# Patient Record
Sex: MALE | Race: OTHER | Employment: FULL TIME | URBAN - METROPOLITAN AREA
[De-identification: names, ages, dates, MRNs, and addresses within clinical notes are randomized per-mention and may not be internally consistent; named-entity substitution may affect disease eponyms.]

---

## 2020-05-12 ENCOUNTER — APPOINTMENT (OUTPATIENT)
Dept: CT IMAGING | Age: 35
End: 2020-05-12
Attending: NURSE PRACTITIONER
Payer: COMMERCIAL

## 2020-05-12 ENCOUNTER — HOSPITAL ENCOUNTER (EMERGENCY)
Age: 35
Discharge: HOME | End: 2020-05-12
Attending: EMERGENCY MEDICINE
Payer: COMMERCIAL

## 2020-05-12 VITALS
SYSTOLIC BLOOD PRESSURE: 151 MMHG | BODY MASS INDEX: 42.57 KG/M2 | DIASTOLIC BLOOD PRESSURE: 108 MMHG | HEIGHT: 65 IN | RESPIRATION RATE: 21 BRPM | OXYGEN SATURATION: 97 % | HEART RATE: 98 BPM | WEIGHT: 255.51 LBS | TEMPERATURE: 98.6 F

## 2020-05-12 DIAGNOSIS — M54.50 ACUTE LEFT-SIDED LOW BACK PAIN WITHOUT SCIATICA: ICD-10-CM

## 2020-05-12 DIAGNOSIS — T14.8XXA MUSCLE CONTUSION: ICD-10-CM

## 2020-05-12 DIAGNOSIS — R81 GLYCOSURIA: Primary | ICD-10-CM

## 2020-05-12 LAB
APPEARANCE UR: CLEAR
BACTERIA URNS QL MICRO: NEGATIVE /HPF
BILIRUB UR QL: NEGATIVE
COLOR UR: ABNORMAL
EPITH CASTS URNS QL MICRO: ABNORMAL /LPF
GLUCOSE UR STRIP.AUTO-MCNC: >1000 MG/DL
HGB UR QL STRIP: NEGATIVE
KETONES UR QL STRIP.AUTO: NEGATIVE MG/DL
LEUKOCYTE ESTERASE UR QL STRIP.AUTO: ABNORMAL
NITRITE UR QL STRIP.AUTO: NEGATIVE
PH UR STRIP: 6 [PH] (ref 5–8)
PROT UR STRIP-MCNC: NEGATIVE MG/DL
RBC #/AREA URNS HPF: ABNORMAL /HPF (ref 0–5)
SP GR UR REFRACTOMETRY: 1.01 (ref 1–1.03)
UROBILINOGEN UR QL STRIP.AUTO: 0.2 EU/DL (ref 0.2–1)
WBC URNS QL MICRO: ABNORMAL /HPF (ref 0–4)

## 2020-05-12 PROCEDURE — 74011250637 HC RX REV CODE- 250/637: Performed by: NURSE PRACTITIONER

## 2020-05-12 PROCEDURE — 72131 CT LUMBAR SPINE W/O DYE: CPT

## 2020-05-12 PROCEDURE — 74011000250 HC RX REV CODE- 250: Performed by: NURSE PRACTITIONER

## 2020-05-12 PROCEDURE — 81001 URINALYSIS AUTO W/SCOPE: CPT

## 2020-05-12 PROCEDURE — 99284 EMERGENCY DEPT VISIT MOD MDM: CPT

## 2020-05-12 PROCEDURE — 70450 CT HEAD/BRAIN W/O DYE: CPT

## 2020-05-12 RX ORDER — NAPROXEN 250 MG/1
500 TABLET ORAL
Status: COMPLETED | OUTPATIENT
Start: 2020-05-12 | End: 2020-05-12

## 2020-05-12 RX ORDER — BISMUTH SUBSALICYLATE 262 MG
1 TABLET,CHEWABLE ORAL DAILY
COMMUNITY
End: 2021-12-06

## 2020-05-12 RX ORDER — ACETAMINOPHEN 500 MG
1000 TABLET ORAL
Status: COMPLETED | OUTPATIENT
Start: 2020-05-12 | End: 2020-05-12

## 2020-05-12 RX ORDER — LIDOCAINE 4 G/100G
1 PATCH TOPICAL
Status: DISCONTINUED | OUTPATIENT
Start: 2020-05-12 | End: 2020-05-12 | Stop reason: HOSPADM

## 2020-05-12 RX ORDER — METFORMIN HYDROCHLORIDE 500 MG/1
500 TABLET ORAL 2 TIMES DAILY WITH MEALS
Qty: 30 TAB | Refills: 0 | Status: SHIPPED | OUTPATIENT
Start: 2020-05-12 | End: 2021-12-06

## 2020-05-12 RX ORDER — METFORMIN HYDROCHLORIDE 500 MG/1
500 TABLET ORAL
Qty: 7 TAB | Refills: 0 | Status: SHIPPED | OUTPATIENT
Start: 2020-05-12 | End: 2020-05-19

## 2020-05-12 RX ADMIN — NAPROXEN 500 MG: 250 TABLET ORAL at 15:18

## 2020-05-12 RX ADMIN — ACETAMINOPHEN 1000 MG: 500 TABLET, FILM COATED ORAL at 15:18

## 2020-05-12 NOTE — DISCHARGE INSTRUCTIONS
You need to follow up within 2 weeks to have your blood sugar levels checked, since we restarted your metformin. Please take 500 mg once daily for 7 days, then 500 mg twice daily thereafter (breakfast + dinner)  You may experience diarrhea from this medication. Take tylenol as needed for back / shoulder pain. You do not have a fracture based on your CT scan. Your back pain may last for several more days. Resume normal activity as tolerated. Patient Education        Back Pain: Care Instructions  Your Care Instructions    Back pain has many possible causes. It is often related to problems with muscles and ligaments of the back. It may also be related to problems with the nerves, discs, or bones of the back. Moving, lifting, standing, sitting, or sleeping in an awkward way can strain the back. Sometimes you don't notice the injury until later. Arthritis is another common cause of back pain. Although it may hurt a lot, back pain usually improves on its own within several weeks. Most people recover in 12 weeks or less. Using good home treatment and being careful not to stress your back can help you feel better sooner. Follow-up care is a key part of your treatment and safety. Be sure to make and go to all appointments, and call your doctor if you are having problems. It's also a good idea to know your test results and keep a list of the medicines you take. How can you care for yourself at home? · Sit or lie in positions that are most comfortable and reduce your pain. Try one of these positions when you lie down:  ? Lie on your back with your knees bent and supported by large pillows. ? Lie on the floor with your legs on the seat of a sofa or chair. ? Lie on your side with your knees and hips bent and a pillow between your legs. ? Lie on your stomach if it does not make pain worse. · Do not sit up in bed, and avoid soft couches and twisted positions.  Bed rest can help relieve pain at first, but it delays healing. Avoid bed rest after the first day of back pain. · Change positions every 30 minutes. If you must sit for long periods of time, take breaks from sitting. Get up and walk around, or lie in a comfortable position. · Try using a heating pad on a low or medium setting for 15 to 20 minutes every 2 or 3 hours. Try a warm shower in place of one session with the heating pad. · You can also try an ice pack for 10 to 15 minutes every 2 to 3 hours. Put a thin cloth between the ice pack and your skin. · Take pain medicines exactly as directed. ? If the doctor gave you a prescription medicine for pain, take it as prescribed. ? If you are not taking a prescription pain medicine, ask your doctor if you can take an over-the-counter medicine. · Take short walks several times a day. You can start with 5 to 10 minutes, 3 or 4 times a day, and work up to longer walks. Walk on level surfaces and avoid hills and stairs until your back is better. · Return to work and other activities as soon as you can. Continued rest without activity is usually not good for your back. · To prevent future back pain, do exercises to stretch and strengthen your back and stomach. Learn how to use good posture, safe lifting techniques, and proper body mechanics. When should you call for help? Call your doctor now or seek immediate medical care if:    · You have new or worsening numbness in your legs.     · You have new or worsening weakness in your legs. (This could make it hard to stand up.)     · You lose control of your bladder or bowels.    Watch closely for changes in your health, and be sure to contact your doctor if:    · You have a fever, lose weight, or don't feel well.     · You do not get better as expected. Where can you learn more? Go to http://sailaja-antonio.info/  Enter I594 in the search box to learn more about \"Back Pain: Care Instructions. \"  Current as of: June 26, 2019Content Version: 12.4  © 1379-5179 [employer]. Care instructions adapted under license by [employer] (which disclaims liability or warranty for this information). If you have questions about a medical condition or this instruction, always ask your healthcare professional. [place] [address] any warranty or liability for your use of this information.
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
## 2021-12-06 ENCOUNTER — APPOINTMENT (OUTPATIENT)
Dept: VASCULAR SURGERY | Age: 36
DRG: 177 | End: 2021-12-06
Attending: INTERNAL MEDICINE

## 2021-12-06 ENCOUNTER — APPOINTMENT (OUTPATIENT)
Dept: GENERAL RADIOLOGY | Age: 36
DRG: 177 | End: 2021-12-06
Attending: EMERGENCY MEDICINE

## 2021-12-06 ENCOUNTER — APPOINTMENT (OUTPATIENT)
Dept: CT IMAGING | Age: 36
DRG: 177 | End: 2021-12-06
Attending: INTERNAL MEDICINE

## 2021-12-06 ENCOUNTER — HOSPITAL ENCOUNTER (INPATIENT)
Age: 36
LOS: 7 days | Discharge: HOME | DRG: 177 | End: 2021-12-13
Attending: EMERGENCY MEDICINE | Admitting: INTERNAL MEDICINE

## 2021-12-06 ENCOUNTER — APPOINTMENT (OUTPATIENT)
Dept: ULTRASOUND IMAGING | Age: 36
DRG: 177 | End: 2021-12-06
Attending: INTERNAL MEDICINE

## 2021-12-06 DIAGNOSIS — J12.82 PNEUMONIA DUE TO COVID-19 VIRUS: Primary | ICD-10-CM

## 2021-12-06 DIAGNOSIS — U07.1 PNEUMONIA DUE TO COVID-19 VIRUS: Primary | ICD-10-CM

## 2021-12-06 DIAGNOSIS — J96.01 ACUTE RESPIRATORY FAILURE WITH HYPOXIA (HCC): ICD-10-CM

## 2021-12-06 LAB
ALBUMIN SERPL-MCNC: 2.6 G/DL (ref 3.5–5)
ALBUMIN/GLOB SERPL: 0.5 {RATIO} (ref 1.1–2.2)
ALP SERPL-CCNC: 122 U/L (ref 45–117)
ALT SERPL-CCNC: 48 U/L (ref 12–78)
ANION GAP SERPL CALC-SCNC: 9 MMOL/L (ref 5–15)
AST SERPL-CCNC: 50 U/L (ref 15–37)
BASOPHILS # BLD: 0 K/UL (ref 0–0.1)
BASOPHILS NFR BLD: 0 % (ref 0–1)
BILIRUB SERPL-MCNC: 0.5 MG/DL (ref 0.2–1)
BUN SERPL-MCNC: 12 MG/DL (ref 6–20)
BUN/CREAT SERPL: 16 (ref 12–20)
CALCIUM SERPL-MCNC: 8.5 MG/DL (ref 8.5–10.1)
CHLORIDE SERPL-SCNC: 100 MMOL/L (ref 97–108)
CO2 SERPL-SCNC: 24 MMOL/L (ref 21–32)
COVID-19 RAPID TEST, COVR: DETECTED
CREAT SERPL-MCNC: 0.73 MG/DL (ref 0.7–1.3)
CRP SERPL-MCNC: 8.4 MG/DL (ref 0–0.6)
D DIMER PPP FEU-MCNC: 8.35 MG/L FEU (ref 0–0.65)
DIFFERENTIAL METHOD BLD: ABNORMAL
EOSINOPHIL # BLD: 0 K/UL (ref 0–0.4)
EOSINOPHIL NFR BLD: 0 % (ref 0–7)
ERYTHROCYTE [DISTWIDTH] IN BLOOD BY AUTOMATED COUNT: 11.4 % (ref 11.5–14.5)
EST. AVERAGE GLUCOSE BLD GHB EST-MCNC: 269 MG/DL
GLOBULIN SER CALC-MCNC: 4.8 G/DL (ref 2–4)
GLUCOSE BLD STRIP.AUTO-MCNC: 204 MG/DL (ref 65–117)
GLUCOSE BLD STRIP.AUTO-MCNC: 208 MG/DL (ref 65–117)
GLUCOSE BLD STRIP.AUTO-MCNC: 257 MG/DL (ref 65–117)
GLUCOSE SERPL-MCNC: 247 MG/DL (ref 65–100)
HAV IGM SER QL: NONREACTIVE
HBA1C MFR BLD: 11 % (ref 4–5.6)
HBV CORE IGM SER QL: NONREACTIVE
HBV SURFACE AG SER QL: <0.1 INDEX
HBV SURFACE AG SER QL: NEGATIVE
HCT VFR BLD AUTO: 44.4 % (ref 36.6–50.3)
HCV AB SERPL QL IA: NONREACTIVE
HGB BLD-MCNC: 15.5 G/DL (ref 12.1–17)
IMM GRANULOCYTES # BLD AUTO: 0.2 K/UL (ref 0–0.04)
IMM GRANULOCYTES NFR BLD AUTO: 2 % (ref 0–0.5)
LACTATE SERPL-SCNC: 1.2 MMOL/L (ref 0.4–2)
LYMPHOCYTES # BLD: 1.2 K/UL (ref 0.8–3.5)
LYMPHOCYTES NFR BLD: 12 % (ref 12–49)
MCH RBC QN AUTO: 28.8 PG (ref 26–34)
MCHC RBC AUTO-ENTMCNC: 34.9 G/DL (ref 30–36.5)
MCV RBC AUTO: 82.5 FL (ref 80–99)
MONOCYTES # BLD: 0.6 K/UL (ref 0–1)
MONOCYTES NFR BLD: 7 % (ref 5–13)
NEUTS SEG # BLD: 7.4 K/UL (ref 1.8–8)
NEUTS SEG NFR BLD: 79 % (ref 32–75)
NRBC # BLD: 0 K/UL (ref 0–0.01)
NRBC BLD-RTO: 0 PER 100 WBC
PLATELET # BLD AUTO: 412 K/UL (ref 150–400)
PMV BLD AUTO: 8.9 FL (ref 8.9–12.9)
POTASSIUM SERPL-SCNC: 4.2 MMOL/L (ref 3.5–5.1)
PROCALCITONIN SERPL-MCNC: 0.08 NG/ML
PROCALCITONIN SERPL-MCNC: 0.11 NG/ML
PROT SERPL-MCNC: 7.4 G/DL (ref 6.4–8.2)
RBC # BLD AUTO: 5.38 M/UL (ref 4.1–5.7)
SERVICE CMNT-IMP: ABNORMAL
SODIUM SERPL-SCNC: 133 MMOL/L (ref 136–145)
SOURCE, COVRS: ABNORMAL
SP1: NORMAL
SP2: NORMAL
SP3: NORMAL
TROPONIN-HIGH SENSITIVITY: 4 NG/L (ref 0–76)
TROPONIN-HIGH SENSITIVITY: 6 NG/L (ref 0–76)
WBC # BLD AUTO: 9.4 K/UL (ref 4.1–11.1)

## 2021-12-06 PROCEDURE — 80074 ACUTE HEPATITIS PANEL: CPT

## 2021-12-06 PROCEDURE — 86140 C-REACTIVE PROTEIN: CPT

## 2021-12-06 PROCEDURE — 76700 US EXAM ABDOM COMPLETE: CPT

## 2021-12-06 PROCEDURE — 74011250637 HC RX REV CODE- 250/637: Performed by: INTERNAL MEDICINE

## 2021-12-06 PROCEDURE — 85025 COMPLETE CBC W/AUTO DIFF WBC: CPT

## 2021-12-06 PROCEDURE — 71275 CT ANGIOGRAPHY CHEST: CPT

## 2021-12-06 PROCEDURE — 74011250636 HC RX REV CODE- 250/636: Performed by: INTERNAL MEDICINE

## 2021-12-06 PROCEDURE — 85379 FIBRIN DEGRADATION QUANT: CPT

## 2021-12-06 PROCEDURE — XW0DXM6 INTRODUCTION OF BARICITINIB INTO MOUTH AND PHARYNX, EXTERNAL APPROACH, NEW TECHNOLOGY GROUP 6: ICD-10-PCS | Performed by: INTERNAL MEDICINE

## 2021-12-06 PROCEDURE — 77010033678 HC OXYGEN DAILY

## 2021-12-06 PROCEDURE — 74011636637 HC RX REV CODE- 636/637: Performed by: INTERNAL MEDICINE

## 2021-12-06 PROCEDURE — 99285 EMERGENCY DEPT VISIT HI MDM: CPT

## 2021-12-06 PROCEDURE — 71045 X-RAY EXAM CHEST 1 VIEW: CPT

## 2021-12-06 PROCEDURE — 94660 CPAP INITIATION&MGMT: CPT

## 2021-12-06 PROCEDURE — 5A09457 ASSISTANCE WITH RESPIRATORY VENTILATION, 24-96 CONSECUTIVE HOURS, CONTINUOUS POSITIVE AIRWAY PRESSURE: ICD-10-PCS | Performed by: INTERNAL MEDICINE

## 2021-12-06 PROCEDURE — 83036 HEMOGLOBIN GLYCOSYLATED A1C: CPT

## 2021-12-06 PROCEDURE — 84484 ASSAY OF TROPONIN QUANT: CPT

## 2021-12-06 PROCEDURE — 77010033711 HC HIGH FLOW OXYGEN

## 2021-12-06 PROCEDURE — 82962 GLUCOSE BLOOD TEST: CPT

## 2021-12-06 PROCEDURE — 77030012794 HC KT NSL CANN/HGH TRAN -A

## 2021-12-06 PROCEDURE — 84145 PROCALCITONIN (PCT): CPT

## 2021-12-06 PROCEDURE — 93970 EXTREMITY STUDY: CPT

## 2021-12-06 PROCEDURE — 83605 ASSAY OF LACTIC ACID: CPT

## 2021-12-06 PROCEDURE — 36415 COLL VENOUS BLD VENIPUNCTURE: CPT

## 2021-12-06 PROCEDURE — 65660000000 HC RM CCU STEPDOWN

## 2021-12-06 PROCEDURE — 80053 COMPREHEN METABOLIC PANEL: CPT

## 2021-12-06 PROCEDURE — 87040 BLOOD CULTURE FOR BACTERIA: CPT

## 2021-12-06 PROCEDURE — 74011000636 HC RX REV CODE- 636: Performed by: INTERNAL MEDICINE

## 2021-12-06 PROCEDURE — 74011250636 HC RX REV CODE- 250/636: Performed by: EMERGENCY MEDICINE

## 2021-12-06 PROCEDURE — 87635 SARS-COV-2 COVID-19 AMP PRB: CPT

## 2021-12-06 PROCEDURE — 93005 ELECTROCARDIOGRAM TRACING: CPT

## 2021-12-06 RX ORDER — ONDANSETRON 2 MG/ML
4 INJECTION INTRAMUSCULAR; INTRAVENOUS
Status: DISCONTINUED | OUTPATIENT
Start: 2021-12-06 | End: 2021-12-13 | Stop reason: HOSPADM

## 2021-12-06 RX ORDER — INSULIN LISPRO 100 [IU]/ML
INJECTION, SOLUTION INTRAVENOUS; SUBCUTANEOUS
Status: DISCONTINUED | OUTPATIENT
Start: 2021-12-06 | End: 2021-12-08

## 2021-12-06 RX ORDER — ENOXAPARIN SODIUM 100 MG/ML
40 INJECTION SUBCUTANEOUS EVERY 12 HOURS
Status: DISCONTINUED | OUTPATIENT
Start: 2021-12-06 | End: 2021-12-06

## 2021-12-06 RX ORDER — DOXYCYCLINE 100 MG/1
100 CAPSULE ORAL 2 TIMES DAILY
COMMUNITY
End: 2021-12-13

## 2021-12-06 RX ORDER — MAGNESIUM SULFATE 100 %
4 CRYSTALS MISCELLANEOUS AS NEEDED
Status: DISCONTINUED | OUTPATIENT
Start: 2021-12-06 | End: 2021-12-13 | Stop reason: HOSPADM

## 2021-12-06 RX ORDER — DEXTROSE 50 % IN WATER (D50W) INTRAVENOUS SYRINGE
12.5-25 AS NEEDED
Status: DISCONTINUED | OUTPATIENT
Start: 2021-12-06 | End: 2021-12-10 | Stop reason: SDUPTHER

## 2021-12-06 RX ORDER — DEXAMETHASONE SODIUM PHOSPHATE 4 MG/ML
10 INJECTION, SOLUTION INTRA-ARTICULAR; INTRALESIONAL; INTRAMUSCULAR; INTRAVENOUS; SOFT TISSUE EVERY 12 HOURS
Status: DISCONTINUED | OUTPATIENT
Start: 2021-12-06 | End: 2021-12-11

## 2021-12-06 RX ORDER — ONDANSETRON 4 MG/1
4 TABLET, ORALLY DISINTEGRATING ORAL
Status: DISCONTINUED | OUTPATIENT
Start: 2021-12-06 | End: 2021-12-13 | Stop reason: HOSPADM

## 2021-12-06 RX ORDER — SODIUM CHLORIDE 0.9 % (FLUSH) 0.9 %
5-40 SYRINGE (ML) INJECTION AS NEEDED
Status: DISCONTINUED | OUTPATIENT
Start: 2021-12-06 | End: 2021-12-13 | Stop reason: HOSPADM

## 2021-12-06 RX ORDER — DEXAMETHASONE SODIUM PHOSPHATE 4 MG/ML
6 INJECTION, SOLUTION INTRA-ARTICULAR; INTRALESIONAL; INTRAMUSCULAR; INTRAVENOUS; SOFT TISSUE EVERY 24 HOURS
Status: DISCONTINUED | OUTPATIENT
Start: 2021-12-06 | End: 2021-12-06

## 2021-12-06 RX ORDER — BENZONATATE 200 MG/1
200 CAPSULE ORAL
COMMUNITY
End: 2021-12-13

## 2021-12-06 RX ORDER — NAPROXEN 500 MG/1
500 TABLET ORAL 2 TIMES DAILY WITH MEALS
COMMUNITY
End: 2021-12-13

## 2021-12-06 RX ORDER — ACETAMINOPHEN 325 MG/1
650 TABLET ORAL
Status: DISCONTINUED | OUTPATIENT
Start: 2021-12-06 | End: 2021-12-13 | Stop reason: HOSPADM

## 2021-12-06 RX ORDER — SODIUM CHLORIDE 0.9 % (FLUSH) 0.9 %
5-40 SYRINGE (ML) INJECTION EVERY 8 HOURS
Status: DISCONTINUED | OUTPATIENT
Start: 2021-12-06 | End: 2021-12-13 | Stop reason: HOSPADM

## 2021-12-06 RX ORDER — ACETAMINOPHEN 650 MG/1
650 SUPPOSITORY RECTAL
Status: DISCONTINUED | OUTPATIENT
Start: 2021-12-06 | End: 2021-12-13 | Stop reason: HOSPADM

## 2021-12-06 RX ORDER — POLYETHYLENE GLYCOL 3350 17 G/17G
17 POWDER, FOR SOLUTION ORAL DAILY PRN
Status: DISCONTINUED | OUTPATIENT
Start: 2021-12-06 | End: 2021-12-13 | Stop reason: HOSPADM

## 2021-12-06 RX ORDER — ENOXAPARIN SODIUM 150 MG/ML
1 INJECTION SUBCUTANEOUS EVERY 12 HOURS
Status: DISCONTINUED | OUTPATIENT
Start: 2021-12-06 | End: 2021-12-10

## 2021-12-06 RX ORDER — ENOXAPARIN SODIUM 100 MG/ML
1 INJECTION SUBCUTANEOUS EVERY 12 HOURS
Status: DISCONTINUED | OUTPATIENT
Start: 2021-12-06 | End: 2021-12-06

## 2021-12-06 RX ADMIN — BARICITINIB 4 MG: 2 TABLET, FILM COATED ORAL at 15:28

## 2021-12-06 RX ADMIN — Medication 10 ML: at 23:17

## 2021-12-06 RX ADMIN — IOPAMIDOL 80 ML: 755 INJECTION, SOLUTION INTRAVENOUS at 17:43

## 2021-12-06 RX ADMIN — DEXAMETHASONE SODIUM PHOSPHATE 10 MG: 4 INJECTION, SOLUTION INTRAMUSCULAR; INTRAVENOUS at 23:02

## 2021-12-06 RX ADMIN — INSULIN LISPRO 3 UNITS: 100 INJECTION, SOLUTION INTRAVENOUS; SUBCUTANEOUS at 12:18

## 2021-12-06 RX ADMIN — SODIUM CHLORIDE 1000 ML: 9 INJECTION, SOLUTION INTRAVENOUS at 09:45

## 2021-12-06 RX ADMIN — DEXAMETHASONE SODIUM PHOSPHATE 6 MG: 4 INJECTION, SOLUTION INTRAMUSCULAR; INTRAVENOUS at 12:10

## 2021-12-06 RX ADMIN — INSULIN LISPRO 5 UNITS: 100 INJECTION, SOLUTION INTRAVENOUS; SUBCUTANEOUS at 19:31

## 2021-12-06 RX ADMIN — INSULIN LISPRO 2 UNITS: 100 INJECTION, SOLUTION INTRAVENOUS; SUBCUTANEOUS at 23:10

## 2021-12-06 RX ADMIN — ENOXAPARIN SODIUM 40 MG: 100 INJECTION SUBCUTANEOUS at 12:15

## 2021-12-06 RX ADMIN — ENOXAPARIN SODIUM 120 MG: 150 INJECTION SUBCUTANEOUS at 19:34

## 2021-12-06 RX ADMIN — Medication 10 ML: at 15:29

## 2021-12-06 NOTE — ED PROVIDER NOTES
Mr. Dada Roe is a 43yo male who presents to the ER with complaints of shortness of breath. He said that his symptoms started 4 to 5 days ago. He was seen some point and had a negative Covid test.  He reports that he began to feel significantly short of breath 2 days ago. He said that he cannot walk with a very short distance without getting short of breath. He states he does not feel particularly short of breath when he sits still. He denies chest pain. He has not been vaccinated for Covid. No changes with his urine or bowel movements. He denies any complaints. Past Medical History:   Diagnosis Date    Diabetes ([facility] [address]        No past surgical history on file. No family history on file. Social History     Socioeconomic History    Marital status: SINGLE     Spouse name: Not on file    Number of children: Not on file    Years of education: Not on file    Highest education level: Not on file   Occupational History    Not on file   Tobacco Use    Smoking status: Not on file    Smokeless tobacco: Never Used   Substance and Sexual Activity    Alcohol use: Never    Drug use: Not on file    Sexual activity: Not on file   Other Topics Concern    Not on file   Social History Narrative    Not on file     Social Determinants of Health     Financial Resource Strain:     Difficulty of Paying Living Expenses: Not on file   Food Insecurity:     Worried About Running Out of Food in the Last Year: Not on file    Ricci of Food in the Last Year: Not on file   Transportation Needs:     Lack of Transportation (Medical): Not on file    Lack of Transportation (Non-Medical):  Not on file   Physical Activity:     Days of Exercise per Week: Not on file    Minutes of Exercise per Session: Not on file   Stress:     Feeling of Stress : Not on file   Social Connections:     Frequency of Communication with Friends and Family: Not on file    Frequency of Social Gatherings with Friends and Family: Not on file    Attends Latter day Services: Not on file    Active Member of Clubs or Organizations: Not on file    Attends Club or Organization Meetings: Not on file    Marital Status: Not on file   Intimate Partner Violence:     Fear of Current or Ex-Partner: Not on file    Emotionally Abused: Not on file    Physically Abused: Not on file    Sexually Abused: Not on file   Housing Stability:     Unable to Pay for Housing in the Last Year: Not on file    Number of [place] in the Last Year: Not on file    Unstable Housing in the Last Year: Not on file         ALLERGIES: Patient has no known allergies. Review of Systems   Constitutional: Positive for fever. Negative for chills. HENT: Negative for rhinorrhea and sore throat. Respiratory: Positive for cough and shortness of breath. Cardiovascular: Negative for chest pain. Gastrointestinal: Negative for abdominal pain, diarrhea, nausea and vomiting. Genitourinary: Negative for dysuria and hematuria. Musculoskeletal: Negative for arthralgias and myalgias. Skin: Negative for pallor and rash. Neurological: Negative for dizziness, weakness and light-headedness. All other systems reviewed and are negative. Vitals:    12/06/21 0908 12/06/21 0915 12/06/21 0940 12/06/21 1021   BP:       Pulse:   (!) 111    Resp:       Temp:       SpO2: (!) 87% 90% 90% 91%            Physical Exam     Vital signs reviewed. Nursing notes reviewed.     Const:  No acute distress, well developed, well nourished  Head:  Atraumatic, normocephalic  Eyes:  PERRL, conjunctiva normal, no scleral icterus  Neck:  Supple, trachea midline  Cardiovascular:  tachycardic  Resp:  Moderate resp distress, + increased work of breathing  Abd:  Soft, non-tender, non-distended  MSK:  No pedal edema, normal ROM  Neuro:  Alert and oriented x3, no cranial nerve defect  Skin:  Warm, dry, intact  Psych: normal mood and affect, behavior is normal, judgement and thought content is normal          MDM  Number of Diagnoses or Management Options     Amount and/or Complexity of Data Reviewed  Clinical lab tests: ordered and reviewed  Tests in the radiology section of CPT®: ordered and reviewed  Review and summarize past medical records: yes    Patient Progress  Patient progress: stable    ED Course as of 12/06/21 1037   Mon Dec 06, 2021   0858 9:04 AM  I have evaluated the patient as the Provider in Triage. I have reviewed His vital signs and the triage nurse assessment. I have talked with the patient and any available family and advised that I am the provider in triage and have ordered the appropriate study to initiate their work up based on the clinical presentation during my assessment. I have advised that the patient will be accommodated in the Main ED as soon as possible. I have also requested to contact the triage nurse or myself immediately if the patient experiences any changes in their condition during this brief waiting period. BRANDIE Payne    Patient is a 43year old male with history of diabetes who presents to ED c/o shortness of breath and chest pain 10/10. Reports symptoms have been ongoing seen last week and tested for COVID-19 and negative. Patient unvaccinated. Family members also sick with similar sx. Patient is respiratory distress in triage. O2 saturation on RA 71%. O2 sat 85% on 6L O2 via nasal canula. MD aware. [KG]      ED Course User Index  [KG] Humberto Sotelo       Procedures      Total critical care time spent exclusive of procedures:  35 min. Perfect Serve Consult for Admission  10:39 AM    ED Room Number: XK60/29  Patient Name and age:  Sybil Sosa 43 y.o.  male  Working Diagnosis:   1. Pneumonia due to COVID-19 virus    2.  Acute respiratory failure with hypoxia ([facility] [address]        COVID-19 Suspicion:  yes  Sepsis present:  no  Reassessment needed: no  Readmission: no  Isolation Requirements:  yes  Recommended Level of Care: telemetry  Department:[facility] ED - [phone]    Mr. Stefani Moss is a 43yo male who presents to the ER with complaints of SOB. He is significantly SOB in the ER. His sats were in the 70s on room air but improved to 90% on 15L. Pt.  To be evaluated for admission by the hospitalist.

## 2021-12-06 NOTE — CONSULTS
[facility]     Name: Karina Bowser MRN: [id number]   : 1985 Hospital: [address] [place] [address] Dr   Date: 2021        Impression Plan   1. Acute respiratory failure  2. Hypoxia  3. COVID 19 PNA  4. Obesity  5. VAPE user  6.                · Wean HF to keep sats above 90%  · Increase dexamethasone 10 mg q12h  · Continue baricitinib  · Pt instructed to self prone 3 hrs a night and sit at side of bed  · OOB into chair once in a room  · Follow d-dimer  · Enoxaparin changed to full dose anticoagulation   · CTA chest            Pt is critically ill. Critical care time spent with pt exclusive of procedures was 37 min minutes. Pt at risk for further decline due to acute respiratory failure due to COVID 19 PNA    Radiology  ( personally reviewed) CXR reviewed: bilateral multilobar PNA   ABG No results for input(s): PHI, PO2I, PCO2I in the last 72 hours. Subjective     Cc: shortness of breath    42 yo with PMHx of DM presenting with increasing SOB 5 days ago. States he started with fevers about 7 days ago. COVID 19 positive. Former smoker, Ernestina Elise. No hx of lung problems. Unvaccinated. O2 requirement has increased since arriving in the ER. Currently on HF 30 L and 90% and sats of 92%. States that the right side of his chest hurts. Review of Systems:  A comprehensive review of systems was negative except for that written in the HPI. Past Medical History:   Diagnosis Date    Diabetes [facility])       History reviewed. No pertinent surgical history. Prior to Admission medications    Medication Sig Start Date End Date Taking? Authorizing Provider   benzonatate (TESSALON) 200 mg capsule Take 200 mg by mouth three (3) times daily as needed for Cough. Yes Provider, Historical   doxycycline (MONODOX) 100 mg capsule Take 100 mg by mouth two (2) times a day. Yes Provider, Historical   naproxen (NAPROSYN) 500 mg tablet Take 500 mg by mouth two (2) times daily (with meals).    Yes Provider, Historical     Current Facility-Administered Medications   Medication Dose Route Frequency    sodium chloride (NS) flush 5-40 mL  5-40 mL IntraVENous Q8H    enoxaparin (LOVENOX) injection 40 mg  40 mg SubCUTAneous Q12H    insulin lispro (HUMALOG) injection   SubCUTAneous AC&HS    baricitinib (OLUMIANT) tablet 4 mg  4 mg Oral DAILY    dexamethasone (DECADRON) 4 mg/mL injection 10 mg  10 mg IntraVENous Q12H     No Known Allergies   Social History     Tobacco Use    Smoking status: Current Every Day Smoker    Smokeless tobacco: Never Used   Substance Use Topics    Alcohol use: Not Currently     Comment: socially       Family History   Problem Relation Age of Onset    Diabetes Mother           Laboratory: I have personally reviewed the critical care flowsheet and labs.      Recent Labs     12/06/21  0921   WBC 9.4   HGB 15.5   HCT 44.4   *     Recent Labs     12/06/21 0921   *   K 4.2      CO2 24   *   BUN 12   CREA 0.73   CA 8.5   ALB 2.6*   ALT 48       Objective:     Mode Rate Tidal Volume Pressure FiO2 PEEP            100 %       Vital Signs:     TMAX(24)      Intake/Output:   Last shift:         Last 3 shifts: 12/06 0701 - 12/06 1900  In: 1000 [I.V.:1000]  Out: - RRIOLAST3    Intake/Output Summary (Last 24 hours) at 12/6/2021 1637  Last data filed at 12/6/2021 1045  Gross per 24 hour   Intake 1000 ml   Output    Net 1000 ml     EXAM:   GENERAL: awake, alert, on HF, HEENT:  PERRL, EOMI, no alar flaring or epistaxis, oral mucosa moist without cyanosis, NECK:  no jugular vein distention, no retractions, no thyromegaly or masses, LUNGS: scant crackles bilaterally , HEART:  Regular rate and rhythm with no MGR; no edema is present, ABDOMEN:  soft with no tenderness, bowel sounds present, EXTREMITIES:  warm with no cyanosis, SKIN:  no jaundice or ecchymosis and NEUROLOGIC:  alert and oriented, grossly non-focal    Liz MD Audra  [facility] [place]

## 2021-12-06 NOTE — ED NOTES
Charge RN aware patient needs exam room, patient to remain in triage on monitor for continuous pulse ox monitoring

## 2021-12-06 NOTE — PROGRESS NOTES
[facility] Dosing Services: 12/6/2021    Enoxaparin was automatically dose-adjusted per [facility] P&T Committee Protocol, with respect to patient's BMI. Body mass index is 42.52 kg/m². Assessment/Plan: BMI 42.5. Enoxaparin changed to 40 mg SC every 12 hours per P&T approved protocol for patient with BMI greater than 40.     Ramirez Drafts, Pharmacist

## 2021-12-06 NOTE — PROGRESS NOTES
Admission Medication Reconciliation:    Allergies:  Patient has no known allergies. Prior to Admission Medications:   Prior to Admission Medications   Prescriptions Last Dose Informant Patient Reported? Taking?   benzonatate (TESSALON) 200 mg capsule 12/6/2021 at AM  Yes Yes   Sig: Take 200 mg by mouth three (3) times daily as needed for Cough. doxycycline (MONODOX) 100 mg capsule 12/6/2021 at AM  Yes Yes   Sig: Take 100 mg by mouth two (2) times a day. naproxen (NAPROSYN) 500 mg tablet 12/6/2021 at AM  Yes Yes   Sig: Take 500 mg by mouth two (2) times daily (with meals).       Facility-Administered Medications: None     Thank you,  Adarsh Bowers, PHARMD

## 2021-12-06 NOTE — ED TRIAGE NOTES
Patient arrives with complaints of shortness of breath and chest pain for about a week     Not vaccinated, reports sick family members     EKG obtained prior to start of triage     Oxygen saturations 73% with room air, placed on 2L up to 76%, up to 4L 84% up to 6L 86%, placed on NRB

## 2021-12-06 NOTE — ED NOTES
Oxygen saturations remain 90-91% with 15 L NRB, Dr Jannet Banerjee made aware, remain on NRB at this time    Patient to exam room from triage at this time

## 2021-12-06 NOTE — ED NOTES
Notified RT of Pt o2 sats of 86 to 88 on 15L mid flow nasal canula. Will go eval pt and possibly bump to high flow.

## 2021-12-07 LAB
ALBUMIN SERPL-MCNC: 2.4 G/DL (ref 3.5–5)
ALBUMIN/GLOB SERPL: 0.5 {RATIO} (ref 1.1–2.2)
ALP SERPL-CCNC: 111 U/L (ref 45–117)
ALT SERPL-CCNC: 42 U/L (ref 12–78)
ANION GAP SERPL CALC-SCNC: 7 MMOL/L (ref 5–15)
ARTERIAL PATENCY WRIST A: NO
AST SERPL-CCNC: 39 U/L (ref 15–37)
ATRIAL RATE: 113 BPM
BASE EXCESS BLDA CALC-SCNC: 0.3 MMOL/L
BASOPHILS # BLD: 0 K/UL (ref 0–0.1)
BASOPHILS NFR BLD: 0 % (ref 0–1)
BDY SITE: ABNORMAL
BILIRUB SERPL-MCNC: 0.5 MG/DL (ref 0.2–1)
BUN SERPL-MCNC: 11 MG/DL (ref 6–20)
BUN/CREAT SERPL: 15 (ref 12–20)
CALCIUM SERPL-MCNC: 8.3 MG/DL (ref 8.5–10.1)
CALCULATED P AXIS, ECG09: 38 DEGREES
CALCULATED R AXIS, ECG10: -10 DEGREES
CALCULATED T AXIS, ECG11: 3 DEGREES
CHLORIDE SERPL-SCNC: 100 MMOL/L (ref 97–108)
CO2 SERPL-SCNC: 28 MMOL/L (ref 21–32)
CREAT SERPL-MCNC: 0.73 MG/DL (ref 0.7–1.3)
D DIMER PPP FEU-MCNC: 7.58 MG/L FEU (ref 0–0.65)
DIAGNOSIS, 93000: NORMAL
DIFFERENTIAL METHOD BLD: ABNORMAL
EOSINOPHIL # BLD: 0 K/UL (ref 0–0.4)
EOSINOPHIL NFR BLD: 0 % (ref 0–7)
ERYTHROCYTE [DISTWIDTH] IN BLOOD BY AUTOMATED COUNT: 11.4 % (ref 11.5–14.5)
FERRITIN SERPL-MCNC: 1160 NG/ML (ref 26–388)
FIO2 ON VENT: 80 %
GAS FLOW.O2 SETTING OXYMISER: 4 L/MIN
GLOBULIN SER CALC-MCNC: 4.6 G/DL (ref 2–4)
GLUCOSE BLD STRIP.AUTO-MCNC: 255 MG/DL (ref 65–117)
GLUCOSE BLD STRIP.AUTO-MCNC: 257 MG/DL (ref 65–117)
GLUCOSE BLD STRIP.AUTO-MCNC: 261 MG/DL (ref 65–117)
GLUCOSE BLD STRIP.AUTO-MCNC: 262 MG/DL (ref 65–117)
GLUCOSE SERPL-MCNC: 223 MG/DL (ref 65–100)
HCO3 BLDA-SCNC: 23 MMOL/L (ref 22–26)
HCT VFR BLD AUTO: 40.1 % (ref 36.6–50.3)
HGB BLD-MCNC: 14.3 G/DL (ref 12.1–17)
IMM GRANULOCYTES # BLD AUTO: 0 K/UL
IMM GRANULOCYTES NFR BLD AUTO: 0 %
IPAP/PIP, IPAPIP: 18
LYMPHOCYTES # BLD: 1.6 K/UL (ref 0.8–3.5)
LYMPHOCYTES NFR BLD: 26 % (ref 12–49)
MCH RBC QN AUTO: 29.7 PG (ref 26–34)
MCHC RBC AUTO-ENTMCNC: 35.7 G/DL (ref 30–36.5)
MCV RBC AUTO: 83.2 FL (ref 80–99)
MONOCYTES # BLD: 0.3 K/UL (ref 0–1)
MONOCYTES NFR BLD: 5 % (ref 5–13)
NEUTS BAND NFR BLD MANUAL: 9 % (ref 0–6)
NEUTS SEG # BLD: 4.2 K/UL (ref 1.8–8)
NEUTS SEG NFR BLD: 60 % (ref 32–75)
NRBC # BLD: 0 K/UL (ref 0–0.01)
NRBC BLD-RTO: 0 PER 100 WBC
P-R INTERVAL, ECG05: 120 MS
PCO2 BLDA: 32 MMHG (ref 35–45)
PEEP RESPIRATORY: 12 CM[H2O]
PH BLDA: 7.47 [PH] (ref 7.35–7.45)
PLATELET # BLD AUTO: 401 K/UL (ref 150–400)
PMV BLD AUTO: 9 FL (ref 8.9–12.9)
PO2 BLDA: 81 MMHG (ref 80–100)
POTASSIUM SERPL-SCNC: 4.5 MMOL/L (ref 3.5–5.1)
PROT SERPL-MCNC: 7 G/DL (ref 6.4–8.2)
Q-T INTERVAL, ECG07: 336 MS
QRS DURATION, ECG06: 84 MS
QTC CALCULATION (BEZET), ECG08: 460 MS
RBC # BLD AUTO: 4.82 M/UL (ref 4.1–5.7)
RBC MORPH BLD: ABNORMAL
SAO2 % BLD: 97 % (ref 92–97)
SAO2% DEVICE SAO2% SENSOR NAME: ABNORMAL
SERVICE CMNT-IMP: ABNORMAL
SODIUM SERPL-SCNC: 135 MMOL/L (ref 136–145)
SPECIMEN SITE: ABNORMAL
TROPONIN-HIGH SENSITIVITY: 4 NG/L (ref 0–76)
VENTRICULAR RATE, ECG03: 113 BPM
WBC # BLD AUTO: 6.1 K/UL (ref 4.1–11.1)

## 2021-12-07 PROCEDURE — 80053 COMPREHEN METABOLIC PANEL: CPT

## 2021-12-07 PROCEDURE — 36415 COLL VENOUS BLD VENIPUNCTURE: CPT

## 2021-12-07 PROCEDURE — 94640 AIRWAY INHALATION TREATMENT: CPT

## 2021-12-07 PROCEDURE — 85025 COMPLETE CBC W/AUTO DIFF WBC: CPT

## 2021-12-07 PROCEDURE — 74011000250 HC RX REV CODE- 250: Performed by: INTERNAL MEDICINE

## 2021-12-07 PROCEDURE — 74011250636 HC RX REV CODE- 250/636: Performed by: INTERNAL MEDICINE

## 2021-12-07 PROCEDURE — 74011636637 HC RX REV CODE- 636/637: Performed by: INTERNAL MEDICINE

## 2021-12-07 PROCEDURE — 82962 GLUCOSE BLOOD TEST: CPT

## 2021-12-07 PROCEDURE — 94660 CPAP INITIATION&MGMT: CPT

## 2021-12-07 PROCEDURE — 85379 FIBRIN DEGRADATION QUANT: CPT

## 2021-12-07 PROCEDURE — 74011250637 HC RX REV CODE- 250/637: Performed by: INTERNAL MEDICINE

## 2021-12-07 PROCEDURE — 82803 BLOOD GASES ANY COMBINATION: CPT

## 2021-12-07 PROCEDURE — 65610000006 HC RM INTENSIVE CARE

## 2021-12-07 PROCEDURE — 77010033711 HC HIGH FLOW OXYGEN

## 2021-12-07 PROCEDURE — 36600 WITHDRAWAL OF ARTERIAL BLOOD: CPT

## 2021-12-07 PROCEDURE — 84484 ASSAY OF TROPONIN QUANT: CPT

## 2021-12-07 PROCEDURE — 82728 ASSAY OF FERRITIN: CPT

## 2021-12-07 RX ORDER — INSULIN GLARGINE 100 [IU]/ML
4 INJECTION, SOLUTION SUBCUTANEOUS DAILY
Status: DISCONTINUED | OUTPATIENT
Start: 2021-12-07 | End: 2021-12-07

## 2021-12-07 RX ORDER — IPRATROPIUM BROMIDE AND ALBUTEROL SULFATE 2.5; .5 MG/3ML; MG/3ML
3 SOLUTION RESPIRATORY (INHALATION)
Status: DISCONTINUED | OUTPATIENT
Start: 2021-12-07 | End: 2021-12-13 | Stop reason: HOSPADM

## 2021-12-07 RX ORDER — INSULIN GLARGINE 100 [IU]/ML
10 INJECTION, SOLUTION SUBCUTANEOUS DAILY
Status: DISCONTINUED | OUTPATIENT
Start: 2021-12-08 | End: 2021-12-08

## 2021-12-07 RX ORDER — FUROSEMIDE 10 MG/ML
20 INJECTION INTRAMUSCULAR; INTRAVENOUS ONCE
Status: COMPLETED | OUTPATIENT
Start: 2021-12-07 | End: 2021-12-07

## 2021-12-07 RX ADMIN — INSULIN LISPRO 3 UNITS: 100 INJECTION, SOLUTION INTRAVENOUS; SUBCUTANEOUS at 21:20

## 2021-12-07 RX ADMIN — INSULIN LISPRO 5 UNITS: 100 INJECTION, SOLUTION INTRAVENOUS; SUBCUTANEOUS at 16:49

## 2021-12-07 RX ADMIN — Medication 10 ML: at 15:32

## 2021-12-07 RX ADMIN — IPRATROPIUM BROMIDE AND ALBUTEROL SULFATE 3 ML: 2.5; .5 SOLUTION RESPIRATORY (INHALATION) at 20:52

## 2021-12-07 RX ADMIN — DEXAMETHASONE SODIUM PHOSPHATE 10 MG: 4 INJECTION, SOLUTION INTRAMUSCULAR; INTRAVENOUS at 21:18

## 2021-12-07 RX ADMIN — BARICITINIB 4 MG: 2 TABLET, FILM COATED ORAL at 09:07

## 2021-12-07 RX ADMIN — INSULIN LISPRO 3 UNITS: 100 INJECTION, SOLUTION INTRAVENOUS; SUBCUTANEOUS at 12:11

## 2021-12-07 RX ADMIN — INSULIN LISPRO 5 UNITS: 100 INJECTION, SOLUTION INTRAVENOUS; SUBCUTANEOUS at 09:04

## 2021-12-07 RX ADMIN — DEXAMETHASONE SODIUM PHOSPHATE 10 MG: 4 INJECTION, SOLUTION INTRAMUSCULAR; INTRAVENOUS at 09:11

## 2021-12-07 RX ADMIN — INSULIN GLARGINE 4 UNITS: 100 INJECTION, SOLUTION SUBCUTANEOUS at 09:05

## 2021-12-07 RX ADMIN — Medication 10 ML: at 09:04

## 2021-12-07 RX ADMIN — FUROSEMIDE 20 MG: 10 INJECTION, SOLUTION INTRAMUSCULAR; INTRAVENOUS at 20:05

## 2021-12-07 RX ADMIN — ENOXAPARIN SODIUM 120 MG: 150 INJECTION SUBCUTANEOUS at 06:57

## 2021-12-07 RX ADMIN — ENOXAPARIN SODIUM 120 MG: 150 INJECTION SUBCUTANEOUS at 18:16

## 2021-12-07 NOTE — PROGRESS NOTES
[facility]     Name: Genet Calderón MRN: [id number]   : 1985 Hospital: [address]   Date: 2021        Impression Plan   1. Acute respiratory failure  2. Hypoxia  3. COVID 19 PNA  4. Obesity  5. VAPE user  6.                · Continous Bipap- EPAP lowered to 10 and fiO2 to 90% with sats of 97%  · Cont. dexamethasone 10 mg q12h  · Continue baricitinib  · Pt instructed to self prone 3 hrs a night and sit at side of bed  · OOB into chair once out of ER  · Follow d-dimer  · Enoxaparin- full dose anticoagulation. Pt is critically ill. Critical care time spent with pt exclusive of procedures was 34 min minutes. Pt at risk for further decline due to acute respiratory failure due to COVID 19 PNA    Radiology  ( personally reviewed) CXR reviewed: bilateral multilobar PNA   ABG No results for input(s): PHI, PO2I, PCO2I in the last 72 hours. Subjective     Cc: shortness of breath    44 yo with PMHx of DM presenting with increasing SOB 5 days ago. States he started with fevers about 7 days ago. COVID 19 positive. Former smoker, Elizabeth Johnson. No hx of lung problems. Unvaccinated. O2 requirement has increased since arriving in the ER. Currently on HF 30 L and 90% and sats of 92%. States that the right side of his chest hurts. Overnight events:  Hypoxia/WOB progressed. Pt now on bipap 20/12 FiO2 100%  DVT on doppler  CTA negative for PE  Pt states he feels SOB, but bipap helping. Past Medical History:   Diagnosis Date    Diabetes [facility])       History reviewed. No pertinent surgical history. Prior to Admission medications    Medication Sig Start Date End Date Taking? Authorizing Provider   benzonatate (TESSALON) 200 mg capsule Take 200 mg by mouth three (3) times daily as needed for Cough. Yes Provider, Historical   doxycycline (MONODOX) 100 mg capsule Take 100 mg by mouth two (2) times a day.    Yes Provider, Historical   naproxen (NAPROSYN) 500 mg tablet Take 500 mg by mouth two (2) times daily (with meals). Yes Provider, Historical     Current Facility-Administered Medications   Medication Dose Route Frequency    insulin glargine (LANTUS) injection 4 Units  4 Units SubCUTAneous DAILY    sodium chloride (NS) flush 5-40 mL  5-40 mL IntraVENous Q8H    insulin lispro (HUMALOG) injection   SubCUTAneous AC&HS    baricitinib (OLUMIANT) tablet 4 mg  4 mg Oral DAILY    dexamethasone (DECADRON) 4 mg/mL injection 10 mg  10 mg IntraVENous Q12H    enoxaparin (LOVENOX) injection 120 mg  1 mg/kg SubCUTAneous Q12H     No Known Allergies   Social History     Tobacco Use    Smoking status: Current Every Day Smoker    Smokeless tobacco: Never Used   Substance Use Topics    Alcohol use: Not Currently     Comment: socially       Family History   Problem Relation Age of Onset    Diabetes Mother           Laboratory: I have personally reviewed the critical care flowsheet and labs. Recent Labs     12/07/21  0029 12/06/21  0921   WBC 6.1 9.4   HGB 14.3 15.5   HCT 40.1 44.4   * 412*     Recent Labs     12/07/21  0029 12/06/21  0921   * 133*   K 4.5 4.2    100   CO2 28 24   * 247*   BUN 11 12   CREA 0.73 0.73   CA 8.3* 8.5   ALB 2.4* 2.6*   ALT 42 48       Objective:     Mode Rate Tidal Volume Pressure FiO2 PEEP            100 %       Vital Signs:     TMAX(24)      Intake/Output:   Last shift:      Ventilator Volumes  Vt Spont (ml): 555 ml  Ve Observed (l/min): 17 l/min  Last 3 shifts: No intake/output data recorded. RRIOLAST3    Intake/Output Summary (Last 24 hours) at 12/7/2021 0823  Last data filed at 12/6/2021 1045  Gross per 24 hour   Intake 1000 ml   Output    Net 1000 ml     EXAM:   GENERAL: awake, alert, on bipap, HEENT:  PERRL, EOMI, no alar flaring or epistaxis, oral mucosa moist without cyanosis, NECK:  no jugular vein distention, no retractions, no thyromegaly or masses, LUNGS: scant crackles bilaterally , HEART:  Regular rate and rhythm with no MGR; no edema is present, ABDOMEN:  soft with no tenderness, bowel sounds present, EXTREMITIES:  warm with no cyanosis, SKIN:  no jaundice or ecchymosis and NEUROLOGIC:  alert and oriented, grossly non-focal    Donelda Goldberg, MD  [facility]

## 2021-12-07 NOTE — H&P
Karan Helton [facility] [address]  [address] [facility], [address] [place] [address]  [phone]    Admission History and Physical      NAME:  Palomo Blackwell   :   1985   MRN:  [id number]     PCP:  None     Date/Time of service:  2021          Subjective:     CHIEF COMPLAINT: Dyspnea    HISTORY OF PRESENT ILLNESS:     Mr. Macey Spann is a 43 y.o. male reports past medical history of diabetes but does not appear in any home meds, tobacco abuse who is admitted with acute respiratory failure due to Covid. Mr. Macey Spann states that about 5 days ago he began to experience shortness of breath, cough, pleuritic chest pain, fatigue  and fevers/chills. He denies any associated heart palpitations, edema. He has not been vaccinated against Covid. Has known sick contacts. In the emergency room, was found to be Covid positive. He required nonrebreather on admission due to hypoxia. No Known Allergies    Prior to Admission medications    Medication Sig Start Date End Date Taking? Authorizing Provider   benzonatate (TESSALON) 200 mg capsule Take 200 mg by mouth three (3) times daily as needed for Cough. Yes Provider, Historical   doxycycline (MONODOX) 100 mg capsule Take 100 mg by mouth two (2) times a day. Yes Provider, Historical   naproxen (NAPROSYN) 500 mg tablet Take 500 mg by mouth two (2) times daily (with meals). Yes Provider, Historical       Past Medical History:   Diagnosis Date    Diabetes ([facility] [address]         History reviewed. No pertinent surgical history.     Social History     Tobacco Use    Smoking status: Current Every Day Smoker    Smokeless tobacco: Never Used   Substance Use Topics    Alcohol use: Not Currently     Comment: socially         Family History   Problem Relation Age of Onset    Diabetes Mother         Review of Systems:  Per HPI    Gen:  Weight gain, weight loss, fever, chills, fatigue  Eyes:  Visual changes, pain,   ENT:  rhinorrhea, sore throat   CVS: Palpitations, chest pain, dizziness, syncope, edema, orthopnea  Pulm:  Cough, dyspnea, sputum, hemoptysis, wheezing  GI:  Abdominal pain, nausea, emesis, diarrhea, constipation, GERD, melena  :  Hematuria, incontinence, dysuria  MS:  Pain, weakness, swelling, arthritis  Skin:  Rash, erythema, wound  Psych:   Insomnia, depression, anxiety, suicidal ideation  Endo:  Heat intolerance, cold intolerance, polyuria  Hem:  Enlarged nodes, bruising, bleeding, night sweats  Renal:   change in urine, flank pain  Neuro:  Numbness, tingling, tremors          Objective:      VITALS:    Vital signs reviewed; most recent are:    Visit Vitals  /74   Pulse (!) 111   Temp 98.9 °F (37.2 °C)   Resp 24   Ht 5' 5\" (1.651 m)   Wt 115.9 kg (255 lb 8.2 oz)   SpO2 (P) 94%   BMI 42.52 kg/m²     SpO2 Readings from Last 6 Encounters:   12/06/21 (P) 94%   05/12/20 97%    O2 Flow Rate (L/min): 30 l/min       Intake/Output Summary (Last 24 hours) at 12/6/2021 2020  Last data filed at 12/6/2021 1045  Gross per 24 hour   Intake 1000 ml   Output    Net 1000 ml        Exam:     Physical Exam:    Gen:  Well-developed, well-nourished, in no acute distress  HEENT:  Pink conjunctivae, PERRL, hearing intact to voice  Resp:  No accessory muscle use, clear breath sounds without wheezes rales or rhonchi  Card:  RRR, No murmurs, normal S1, S2, no peripheral edema  Abd:  Soft, non-tender, non-distended, normoactive bowel sounds are present  Musc:  No cyanosis or clubbing  Skin:  No rashes or ulcers, skin turgor is good  Neuro:  Cranial nerves 3-12 are grossly intact, follows commands appropriately  Psych:  Oriented to person, place, and time, Alert with good insight      Labs:    Recent Labs     12/06/21  0921   WBC 9.4   HGB 15.5   HCT 44.4   *     Recent Labs     12/06/21  0921   *   K 4.2      CO2 24   *   BUN 12   CREA 0.73   CA 8.5   ALB 2.6*   TBILI 0.5   ALT 48     Lab Results   Component Value Date/Time    Glucose (POC) 257 (H) 12/06/2021 06:59 PM    Glucose (POC) 204 (H) 12/06/2021 12:08 PM     No results for input(s): PH, PCO2, PO2, HCO3, FIO2 in the last 72 hours. No results for input(s): INR, INREXT, INREXT in the last 72 hours. Radiology and EKG reviewed: EKG sinus tach cardia. Old Records reviewed in [facility]       Assessment/Plan:           COVID-19 (12/6/2021): Unvaccinated. IV Decadron. Start barcitnib. Low procalcitonin; hold off on antibiotics. Monitor. Acute respiratory failure with hypoxia ([address] (12/6/2021): Due to Covid. Obtain CTA chest due to elevated D-dimer. Incentive spirometry. Inhalers PRN. Currently on high flow nasal cannula. BiPAP as needed. Consult pulmonary medicine. Pleuritic chest pain: Likely due to Covid. Trend troponins. Check echo. Monitor. Hyperglycemia/ Hx of DM: Reports history of diabetes however not on any home meds. Check A1c. Insulin sliding scale. Elevated alk phos/AST: Elevated AST likely due to Covid. Check abdominal ultrasound. Check hep panel. Monitor alk phos likely due to hyperglycemia; tighter glucose control.      Risk of deterioration: high      Total time spent with patient: 39 Minutes **I personally saw and examined the patient during this time period**                 Care Plan discussed with: Patient and Nursing Staff    Discussed:  Code Status and Care Plan    Prophylaxis:  Lovenox    Probable Disposition:  Home w/Family           ___________________________________________________    Attending Physician: Garrett Munroe DO

## 2021-12-07 NOTE — ED NOTES
Verbal shift change report given to [employer] (oncoming nurse) by Delfino Ahn (offgoing nurse). Report included the following information SBAR, Kardex, ED Summary, [facility] and Recent Results.

## 2021-12-07 NOTE — ED NOTES
Verbal shift change report given to Nat Wright (oncoming nurse) by Zhao Reaves (offgoing nurse). Report included the following information SBAR, Kardex, ED Summary, [facility] and Recent Results.

## 2021-12-07 NOTE — ED NOTES
Verbal shift change report given to Minnie SANTOYO (oncoming nurse) by Elvira Wells (offgoing nurse). Report included the following information SBAR, ED Summary, MAR and Recent Results.

## 2021-12-07 NOTE — PROGRESS NOTES
12/7/2021  6:39 PM  Case management note    Patient on BIPAP unable to do eval  Mckenzie Anthony BSRT

## 2021-12-08 ENCOUNTER — APPOINTMENT (OUTPATIENT)
Dept: NON INVASIVE DIAGNOSTICS | Age: 36
DRG: 177 | End: 2021-12-08
Attending: INTERNAL MEDICINE

## 2021-12-08 LAB
ALBUMIN SERPL-MCNC: 2.5 G/DL (ref 3.5–5)
ALBUMIN/GLOB SERPL: 0.5 {RATIO} (ref 1.1–2.2)
ALP SERPL-CCNC: 97 U/L (ref 45–117)
ALT SERPL-CCNC: 34 U/L (ref 12–78)
ANION GAP SERPL CALC-SCNC: 9 MMOL/L (ref 5–15)
AST SERPL-CCNC: 27 U/L (ref 15–37)
BASOPHILS # BLD: 0 K/UL (ref 0–0.1)
BASOPHILS NFR BLD: 0 % (ref 0–1)
BILIRUB SERPL-MCNC: 0.5 MG/DL (ref 0.2–1)
BNP SERPL-MCNC: 74 PG/ML
BUN SERPL-MCNC: 21 MG/DL (ref 6–20)
BUN/CREAT SERPL: 25 (ref 12–20)
CALCIUM SERPL-MCNC: 8.6 MG/DL (ref 8.5–10.1)
CHLORIDE SERPL-SCNC: 100 MMOL/L (ref 97–108)
CO2 SERPL-SCNC: 26 MMOL/L (ref 21–32)
CREAT SERPL-MCNC: 0.85 MG/DL (ref 0.7–1.3)
D DIMER PPP FEU-MCNC: 3.01 MG/L FEU (ref 0–0.65)
DIFFERENTIAL METHOD BLD: ABNORMAL
ECHO AO ASC DIAM: 3.22 CM
ECHO AV ANNULUS DIAM: 3.13 CM
ECHO AV AREA PEAK VELOCITY: 1.94 CM2
ECHO AV AREA/BSA PEAK VELOCITY: 0.9 CM2/M2
ECHO AV PEAK GRADIENT: 8.14 MMHG
ECHO AV PEAK VELOCITY: 142.66 CM/S
ECHO LA AREA 4C: 13.28 CM2
ECHO LA MAJOR AXIS: 3.35 CM
ECHO LA MINOR AXIS: 1.52 CM
ECHO LA VOL 2C: 18.06 ML (ref 18–58)
ECHO LA VOL 4C: 29.67 ML (ref 18–58)
ECHO LA VOL BP: 28.15 ML (ref 18–58)
ECHO LA VOL/BSA BIPLANE: 12.8 ML/M2 (ref 16–28)
ECHO LA VOLUME INDEX A2C: 8.21 ML/M2 (ref 16–28)
ECHO LA VOLUME INDEX A4C: 13.49 ML/M2 (ref 16–28)
ECHO LV INTERNAL DIMENSION DIASTOLIC: 4.96 CM (ref 4.2–5.9)
ECHO LV INTERNAL DIMENSION SYSTOLIC: 3.59 CM
ECHO LV IVSD: 0.74 CM (ref 0.6–1)
ECHO LV MASS 2D: 117.2 G (ref 88–224)
ECHO LV MASS INDEX 2D: 53.3 G/M2 (ref 49–115)
ECHO LV POSTERIOR WALL DIASTOLIC: 0.7 CM (ref 0.6–1)
ECHO LVOT DIAM: 2.04 CM
ECHO LVOT PEAK GRADIENT: 2.87 MMHG
ECHO LVOT PEAK VELOCITY: 84.76 CM/S
ECHO PV MAX VELOCITY: 111.36 CM/S
ECHO PV PEAK INSTANTANEOUS GRADIENT SYSTOLIC: 4.96 MMHG
ECHO PV REGURGITANT MAX VELOCITY: 73.19 CM/S
ECHO RV INTERNAL DIMENSION: 3.29 CM
ECHO RV TAPSE: 3.33 CM (ref 1.5–2)
ECHO TV REGURGITANT MAX VELOCITY: 252.93 CM/S
ECHO TV REGURGITANT PEAK GRADIENT: 26.21 MMHG
EOSINOPHIL # BLD: 0 K/UL (ref 0–0.4)
EOSINOPHIL NFR BLD: 0 % (ref 0–7)
ERYTHROCYTE [DISTWIDTH] IN BLOOD BY AUTOMATED COUNT: 11.3 % (ref 11.5–14.5)
FERRITIN SERPL-MCNC: 1119 NG/ML (ref 26–388)
GLOBULIN SER CALC-MCNC: 4.8 G/DL (ref 2–4)
GLUCOSE BLD STRIP.AUTO-MCNC: 307 MG/DL (ref 65–117)
GLUCOSE BLD STRIP.AUTO-MCNC: 314 MG/DL (ref 65–117)
GLUCOSE BLD STRIP.AUTO-MCNC: 347 MG/DL (ref 65–117)
GLUCOSE BLD STRIP.AUTO-MCNC: 407 MG/DL (ref 65–117)
GLUCOSE SERPL-MCNC: 280 MG/DL (ref 65–100)
HCT VFR BLD AUTO: 41.3 % (ref 36.6–50.3)
HGB BLD-MCNC: 14.3 G/DL (ref 12.1–17)
IMM GRANULOCYTES # BLD AUTO: 0 K/UL
IMM GRANULOCYTES NFR BLD AUTO: 0 %
LA VOL DISK BP: 26.29 ML (ref 18–58)
LYMPHOCYTES # BLD: 1.3 K/UL (ref 0.8–3.5)
LYMPHOCYTES NFR BLD: 17 % (ref 12–49)
MCH RBC QN AUTO: 29.5 PG (ref 26–34)
MCHC RBC AUTO-ENTMCNC: 34.6 G/DL (ref 30–36.5)
MCV RBC AUTO: 85.3 FL (ref 80–99)
MONOCYTES # BLD: 0.4 K/UL (ref 0–1)
MONOCYTES NFR BLD: 6 % (ref 5–13)
MYELOCYTES NFR BLD MANUAL: 2 %
NEUTS SEG # BLD: 5.6 K/UL (ref 1.8–8)
NEUTS SEG NFR BLD: 75 % (ref 32–75)
NRBC # BLD: 0 K/UL (ref 0–0.01)
NRBC BLD-RTO: 0 PER 100 WBC
PLATELET # BLD AUTO: 564 K/UL (ref 150–400)
PMV BLD AUTO: 9.2 FL (ref 8.9–12.9)
POTASSIUM SERPL-SCNC: 4.4 MMOL/L (ref 3.5–5.1)
PROT SERPL-MCNC: 7.3 G/DL (ref 6.4–8.2)
RBC # BLD AUTO: 4.84 M/UL (ref 4.1–5.7)
RBC MORPH BLD: ABNORMAL
SERVICE CMNT-IMP: ABNORMAL
SODIUM SERPL-SCNC: 135 MMOL/L (ref 136–145)
WBC # BLD AUTO: 7.4 K/UL (ref 4.1–11.1)

## 2021-12-08 PROCEDURE — 77010033711 HC HIGH FLOW OXYGEN

## 2021-12-08 PROCEDURE — 65660000000 HC RM CCU STEPDOWN

## 2021-12-08 PROCEDURE — 74011636637 HC RX REV CODE- 636/637: Performed by: INTERNAL MEDICINE

## 2021-12-08 PROCEDURE — 85025 COMPLETE CBC W/AUTO DIFF WBC: CPT

## 2021-12-08 PROCEDURE — 82962 GLUCOSE BLOOD TEST: CPT

## 2021-12-08 PROCEDURE — 82728 ASSAY OF FERRITIN: CPT

## 2021-12-08 PROCEDURE — 36415 COLL VENOUS BLD VENIPUNCTURE: CPT

## 2021-12-08 PROCEDURE — 93306 TTE W/DOPPLER COMPLETE: CPT | Performed by: INTERNAL MEDICINE

## 2021-12-08 PROCEDURE — 74011250636 HC RX REV CODE- 250/636: Performed by: INTERNAL MEDICINE

## 2021-12-08 PROCEDURE — 74011250637 HC RX REV CODE- 250/637: Performed by: INTERNAL MEDICINE

## 2021-12-08 PROCEDURE — 93306 TTE W/DOPPLER COMPLETE: CPT

## 2021-12-08 PROCEDURE — 80053 COMPREHEN METABOLIC PANEL: CPT

## 2021-12-08 PROCEDURE — 94660 CPAP INITIATION&MGMT: CPT

## 2021-12-08 PROCEDURE — 94640 AIRWAY INHALATION TREATMENT: CPT

## 2021-12-08 PROCEDURE — 74011000250 HC RX REV CODE- 250: Performed by: INTERNAL MEDICINE

## 2021-12-08 PROCEDURE — 85379 FIBRIN DEGRADATION QUANT: CPT

## 2021-12-08 PROCEDURE — 83880 ASSAY OF NATRIURETIC PEPTIDE: CPT

## 2021-12-08 RX ORDER — INSULIN GLARGINE 100 [IU]/ML
0.3 INJECTION, SOLUTION SUBCUTANEOUS
Status: DISCONTINUED | OUTPATIENT
Start: 2021-12-08 | End: 2021-12-09

## 2021-12-08 RX ORDER — INSULIN LISPRO 100 [IU]/ML
INJECTION, SOLUTION INTRAVENOUS; SUBCUTANEOUS
Status: DISCONTINUED | OUTPATIENT
Start: 2021-12-08 | End: 2021-12-13 | Stop reason: HOSPADM

## 2021-12-08 RX ORDER — DEXTROSE 50 % IN WATER (D50W) INTRAVENOUS SYRINGE
25-50 AS NEEDED
Status: DISCONTINUED | OUTPATIENT
Start: 2021-12-08 | End: 2021-12-13 | Stop reason: HOSPADM

## 2021-12-08 RX ADMIN — ENOXAPARIN SODIUM 120 MG: 150 INJECTION SUBCUTANEOUS at 06:00

## 2021-12-08 RX ADMIN — INSULIN LISPRO 5 UNITS: 100 INJECTION, SOLUTION INTRAVENOUS; SUBCUTANEOUS at 21:46

## 2021-12-08 RX ADMIN — INSULIN LISPRO 16 UNITS: 100 INJECTION, SOLUTION INTRAVENOUS; SUBCUTANEOUS at 16:54

## 2021-12-08 RX ADMIN — IPRATROPIUM BROMIDE AND ALBUTEROL 1 PUFF: 20; 100 SPRAY, METERED RESPIRATORY (INHALATION) at 13:36

## 2021-12-08 RX ADMIN — ENOXAPARIN SODIUM 120 MG: 150 INJECTION SUBCUTANEOUS at 18:24

## 2021-12-08 RX ADMIN — Medication 10 ML: at 14:00

## 2021-12-08 RX ADMIN — DEXAMETHASONE SODIUM PHOSPHATE 10 MG: 4 INJECTION, SOLUTION INTRAMUSCULAR; INTRAVENOUS at 21:46

## 2021-12-08 RX ADMIN — INSULIN LISPRO 7 UNITS: 100 INJECTION, SOLUTION INTRAVENOUS; SUBCUTANEOUS at 07:30

## 2021-12-08 RX ADMIN — INSULIN GLARGINE 35 UNITS: 100 INJECTION, SOLUTION SUBCUTANEOUS at 21:46

## 2021-12-08 RX ADMIN — Medication 10 ML: at 21:49

## 2021-12-08 RX ADMIN — INSULIN GLARGINE 10 UNITS: 100 INJECTION, SOLUTION SUBCUTANEOUS at 08:45

## 2021-12-08 RX ADMIN — DEXAMETHASONE SODIUM PHOSPHATE 10 MG: 4 INJECTION, SOLUTION INTRAMUSCULAR; INTRAVENOUS at 08:37

## 2021-12-08 RX ADMIN — BARICITINIB 4 MG: 2 TABLET, FILM COATED ORAL at 08:39

## 2021-12-08 RX ADMIN — IPRATROPIUM BROMIDE AND ALBUTEROL SULFATE 3 ML: 2.5; .5 SOLUTION RESPIRATORY (INHALATION) at 01:42

## 2021-12-08 RX ADMIN — IPRATROPIUM BROMIDE AND ALBUTEROL 1 PUFF: 20; 100 SPRAY, METERED RESPIRATORY (INHALATION) at 09:18

## 2021-12-08 NOTE — ED NOTES
TRANSFER - OUT REPORT:    Verbal report given to [facility] RN(name) on [place]  being transferred to [facility](unit) for routine progression of care       Report consisted of patients Situation, Background, Assessment and   Recommendations(SBAR). Information from the following report(s) SBAR, Kardex, MAR, Recent Results and Med Rec Status was reviewed with the receiving nurse. Lines:   Peripheral IV 12/06/21 Antecubital (Active)   Site Assessment Clean, dry, & intact 12/08/21 1200   Phlebitis Assessment 0 12/08/21 1200   Infiltration Assessment 0 12/08/21 1200   Dressing Status Clean, dry, & intact 12/08/21 1200   Dressing Type Transparent 12/08/21 1200   Hub Color/Line Status Capped 12/08/21 1200   Action Taken Open ports on tubing capped 12/08/21 1200   Alcohol Cap Used Yes 12/08/21 1200       Peripheral IV 12/06/21 Left Hand (Active)   Site Assessment Clean, dry, & intact 12/08/21 1200   Phlebitis Assessment 0 12/08/21 1200   Infiltration Assessment 0 12/08/21 1200   Dressing Status Clean, dry, & intact 12/08/21 1200   Dressing Type Transparent 12/08/21 1200   Hub Color/Line Status Capped 12/08/21 1200   Action Taken Open ports on tubing capped 12/08/21 1200   Alcohol Cap Used Yes 12/08/21 1200        Opportunity for questions and clarification was provided.       Patient transported with:   Registered Nurse

## 2021-12-08 NOTE — PROGRESS NOTES
Karan Helton [facility] 79  [address] [facility], [address] [place] [address]  [phone]      Medical Progress Note      NAME: Mary Alvarado   :  1985  MRM:  [id number]    Date/Time of service: 2021         Subjective:     Chief Complaint:  Patient was personally seen and examined by me during this time period. Chart reviewed. F/u acute resp failure due to COVID. Increasing oxygen requirements; now on continuous bipap. Endorses dyspnea, cough. Objective:       Vitals:       Last 24hrs VS reviewed since prior progress note.  Most recent are:    Visit Vitals  /83   Pulse 82   Temp 97 °F (36.1 °C)   Resp 28   Ht 5' 5\" (1.651 m)   Wt 115.9 kg (255 lb 8.2 oz)   SpO2 92%   BMI 42.52 kg/m²     SpO2 Readings from Last 6 Encounters:   21 92%   20 97%    O2 Flow Rate (L/min): 50 l/min   No intake or output data in the 24 hours ending 21 1906     Exam:     Physical Exam:    Gen:  Well-developed, well-nourished, in no acute distress  HEENT:  Pink conjunctivae, PERRL, hearing intact to voice  Resp:  rhonchi b/l  Card:  RRR, No murmurs, normal S1, S2, no peripheral edema  Abd:  Soft, non-tender, non-distended, normoactive bowel sounds are present  Musc:  No cyanosis or clubbing  Skin:  No rashes or ulcers, skin turgor is good  Neuro:  Cranial nerves 3-12 are grossly intact, follows commands appropriately  Psych:  Oriented to person, place, and time, Alert with good insight    Medications Reviewed: (see below)    Lab Data Reviewed: (see below)    ______________________________________________________________________    Medications:     Current Facility-Administered Medications   Medication Dose Route Frequency    [START ON 2021] insulin glargine (LANTUS) injection 10 Units  10 Units SubCUTAneous DAILY    sodium chloride (NS) flush 5-40 mL  5-40 mL IntraVENous Q8H    sodium chloride (NS) flush 5-40 mL  5-40 mL IntraVENous PRN    acetaminophen (TYLENOL) tablet 650 mg  650 mg Oral Q6H PRN    Or    acetaminophen (TYLENOL) suppository 650 mg  650 mg Rectal Q6H PRN    polyethylene glycol (MIRALAX) packet 17 g  17 g Oral DAILY PRN    ondansetron (ZOFRAN ODT) tablet 4 mg  4 mg Oral Q8H PRN    Or    ondansetron (ZOFRAN) injection 4 mg  4 mg IntraVENous Q6H PRN    ipratropium-albuterol (COMBIVENT RESPIMAT) 20 mcg-100 mcg inhalation spray  1 Puff Inhalation Q6H PRN    insulin lispro (HUMALOG) injection   SubCUTAneous AC&HS    glucose chewable tablet 16 g  4 Tablet Oral PRN    dextrose (D50W) injection syrg 12.5-25 g  12.5-25 g IntraVENous PRN    glucagon (GLUCAGEN) injection 1 mg  1 mg IntraMUSCular PRN    baricitinib (OLUMIANT) tablet 4 mg  4 mg Oral DAILY    dexamethasone (DECADRON) 4 mg/mL injection 10 mg  10 mg IntraVENous Q12H    enoxaparin (LOVENOX) injection 120 mg  1 mg/kg SubCUTAneous Q12H     Current Outpatient Medications   Medication Sig    benzonatate (TESSALON) 200 mg capsule Take 200 mg by mouth three (3) times daily as needed for Cough.  doxycycline (MONODOX) 100 mg capsule Take 100 mg by mouth two (2) times a day.  naproxen (NAPROSYN) 500 mg tablet Take 500 mg by mouth two (2) times daily (with meals). Lab Review:     Recent Labs     12/07/21  0029 12/06/21  0921   WBC 6.1 9.4   HGB 14.3 15.5   HCT 40.1 44.4   * 412*     Recent Labs     12/07/21  0029 12/06/21  0921   * 133*   K 4.5 4.2    100   CO2 28 24   * 247*   BUN 11 12   CREA 0.73 0.73   CA 8.3* 8.5   ALB 2.4* 2.6*   TBILI 0.5 0.5   ALT 42 48     Lab Results   Component Value Date/Time    Glucose (POC) 257 (H) 12/07/2021 04:42 PM    Glucose (POC) 261 (H) 12/07/2021 11:56 AM    Glucose (POC) 262 (H) 12/07/2021 08:19 AM    Glucose (POC) 208 (H) 12/06/2021 10:59 PM    Glucose (POC) 257 (H) 12/06/2021 06:59 PM          Assessment / Plan:     Acute respiratory failure with hypoxia ([facility] [address] (12/6/2021): Due to Covid. CTA chest neg. Scheduled inhaler.  Rapid worsening; now on continuous bipap. Continue therapeutic Lovenox. IV lasix PRN. Transfer to ICU. High risk of intubation. Consult intensivist.     TKCAD-13 (12/6/2021): Unvaccinated. IV Decadron increased to BID. Continue baricitnib. Low procalcitonin; hold off on antibiotics. Monitor.     B/l LE dvts: continue therapeutic lovenox. Pleuritic chest pain: Likely due to Covid. cta chest neg. neg troponins. Check echo. Monitor.     Hyperglycemia/ Hx of DM: Reports history of diabetes however not on any home meds. A1c 11. Continue lantus and titrate PRN. Insulin sliding scale.     Elevated alk phos/AST: Elevated AST likely due to Covid. abdominal ultrasound with gallbladder polyp otherwise ok.  hep panel neg. Monitor alk phos likely due to hyperglycemia; tighter glucose control. Gallbladder polyp: f/u OP. Total time spent with patient: 28 Minutes **I personally saw and examined the patient during this time period**  I personally spent 35 minutes in providing critical care. The reason for providing this level of medical care for this critically ill patient was due to a critical illness including resp failure that impaired one or more vital organ systems such that there was a high probability of imminent or life threatening deterioration in the patient's condition. This care involved high complexity decision making to assess, manipulate, and support vital system functions.                  Care Plan discussed with: Patient and Nursing Staff    Discussed:  Care Plan    Prophylaxis:  Lovenox    Disposition:   PT, OT, RN           ___________________________________________________    Attending Physician: Aleksandr Farrar DO

## 2021-12-08 NOTE — PROGRESS NOTES
Reason for Admission:  COVID-19                   RUR Score:   8%                  Plan for utilizing home health:      No    PCP: First and Last name:  None   Name of Practice:    Are you a current patient: Yes/No:    Approximate date of last visit:    Can you participate in a virtual visit with your PCP:                     Current Advanced Directive/Advance Care Plan: Full Code    Healthcare Decision Maker:   Reportedly patient has a sister in the [place] area, but girlfriend is unable to provide a name or contact information. Jami Henry, girlfriend - [phone]                        Transition of Care Plan:                    CM attempted to contact patient by phone to complete initial assessment but was unable to reach him. Assessment information was completed by phone with patient's girlfriend/emergency contact, Jami Henry. Reportedly patient resides at charted address with Ms. Rashaun Castro and her two children. Patient works but is uninsured. He will need a care card application. Patient also does not have a PCP. Patient is independent with ADLs, does not own/use any DME and uses [facility] on [address] 1. CM to follow  2. Pulmonology following  3. Complete [facility] Card application  4. Establish with PCP  5.  Girlfriend or friend to provide transport at discharge    Care Management Interventions  Support Systems: Spouse/Significant Other, Other Family Member(s)  Confirm Follow Up Transport: Friends  The Plan for Transition of Care is Related to the Following Treatment Goals : COVID-19  Discharge Location  Discharge Placement: Home with family assistance     Earnest Chandra LCSW

## 2021-12-08 NOTE — ED NOTES
Patient sitting up in high romo's with bipap on. Patient conversing with family on phone and watching the tv. NAD.  Denies needs at this time

## 2021-12-08 NOTE — PROGRESS NOTES
Attempted to to reach girlfriend whose number is listed in emergency contact to provide an update but no answer; left voice message.

## 2021-12-08 NOTE — ED NOTES
Verbal shift change report given to Jeff Landa (oncoming nurse) by Carleen Nyhan (offgoing nurse). Report included the following information SBAR, ED Summary, Intake/Output and Recent Results.

## 2021-12-08 NOTE — PROGRESS NOTES
[facility]     Name: Lorelei Ragland MRN: [id number]   : 1985 Hospital: [facility]   Date: 2021        Impression Plan   1. Acute respiratory failure  2. Hypoxia  3. COVID 19 PNA  4. Obesity  5. VAPE user  6. Bilateral DVTs- CTA neg for PE               · Will try to give patient a break from continous bipap to HF this morning since RR and oxygenation improved  · Cont. dexamethasone 10 mg q12h  · Continue baricitinib  · Pt instructed to self prone 3 hrs a night and sit at side of bed  · OOB into chair once out of ER  · Follow d-dimer  · Enoxaparin- full dose anticoagulation. Pt is critically ill. Critical care time spent with pt exclusive of procedures was 33 min minutes. Pt at risk for further decline due to acute respiratory failure due to COVID 19 PNA    Radiology  ( personally reviewed) CXR reviewed: bilateral multilobar PNA   ABG No results for input(s): PHI, PO2I, PCO2I in the last 72 hours. Subjective     Cc: shortness of breath    44 yo with PMHx of DM presenting with increasing SOB 5 days ago. States he started with fevers about 7 days ago. COVID 19 positive. Former smoker, Juli Lebron. No hx of lung problems. Unvaccinated. O2 requirement has increased since arriving in the ER. Currently on HF 30 L and 90% and sats of 92%. States that the right side of his chest hurts. Overnight events:  Down to FiO2 70% on bipap  RR down to 20-24  Pt states he feels better. Past Medical History:   Diagnosis Date    Diabetes [facility])       History reviewed. No pertinent surgical history. Prior to Admission medications    Medication Sig Start Date End Date Taking? Authorizing Provider   benzonatate (TESSALON) 200 mg capsule Take 200 mg by mouth three (3) times daily as needed for Cough. Yes Provider, Historical   doxycycline (MONODOX) 100 mg capsule Take 100 mg by mouth two (2) times a day.    Yes Provider, Historical   naproxen (NAPROSYN) 500 mg tablet Take 500 mg by mouth two (2) times daily (with meals). Yes Provider, Historical     Current Facility-Administered Medications   Medication Dose Route Frequency    insulin glargine (LANTUS) injection 10 Units  10 Units SubCUTAneous DAILY    ipratropium-albuterol (COMBIVENT RESPIMAT) 20 mcg-100 mcg inhalation spray  1 Puff Inhalation Q6H RT    Or    albuterol-ipratropium (DUO-NEB) 2.5 MG-0.5 MG/3 ML  3 mL Nebulization Q6H RT    sodium chloride (NS) flush 5-40 mL  5-40 mL IntraVENous Q8H    insulin lispro (HUMALOG) injection   SubCUTAneous AC&HS    baricitinib (OLUMIANT) tablet 4 mg  4 mg Oral DAILY    dexamethasone (DECADRON) 4 mg/mL injection 10 mg  10 mg IntraVENous Q12H    enoxaparin (LOVENOX) injection 120 mg  1 mg/kg SubCUTAneous Q12H     No Known Allergies   Social History     Tobacco Use    Smoking status: Current Every Day Smoker    Smokeless tobacco: Never Used   Substance Use Topics    Alcohol use: Not Currently     Comment: socially       Family History   Problem Relation Age of Onset    Diabetes Mother           Laboratory: I have personally reviewed the critical care flowsheet and labs. Recent Labs     12/08/21  0024 12/07/21  0029 12/06/21  0921   WBC 7.4 6.1 9.4   HGB 14.3 14.3 15.5   HCT 41.3 40.1 44.4   * 401* 412*     Recent Labs     12/08/21  0024 12/07/21  0029 12/06/21  0921   * 135* 133*   K 4.4 4.5 4.2    100 100   CO2 26 28 24   * 223* 247*   BUN 21* 11 12   CREA 0.85 0.73 0.73   CA 8.6 8.3* 8.5   ALB 2.5* 2.4* 2.6*   ALT 34 42 48       Objective:     Mode Rate Tidal Volume Pressure FiO2 PEEP            70 %       Vital Signs:     TMAX(24)      Intake/Output:   Last shift:      Ventilator Volumes  Vt Spont (ml): 654 ml  Ve Observed (l/min): 25 l/min  Last 3 shifts: No intake/output data recorded. [id number]    Intake/Output Summary (Last 24 hours) at 12/8/2021 0751  Last data filed at 12/7/2021 2329  Gross per 24 hour   Intake    Output 1500 ml Net -1500 ml     EXAM:   GENERAL: awake, alert, on bipap, HEENT:  PERRL, EOMI, no alar flaring or epistaxis, oral mucosa moist without cyanosis, NECK:  no jugular vein distention, no retractions, no thyromegaly or masses, LUNGS: CTA, no w/r/r, HEART:  Regular rate and rhythm with no MGR; no edema is present, ABDOMEN:  soft with no tenderness, bowel sounds present, EXTREMITIES:  warm with no cyanosis, SKIN:  no jaundice or ecchymosis and NEUROLOGIC:  alert and oriented, grossly non-focal    Diane Rowan MD  [facility] [place]

## 2021-12-09 LAB
ALBUMIN SERPL-MCNC: 2.7 G/DL (ref 3.5–5)
ALBUMIN/GLOB SERPL: 0.6 {RATIO} (ref 1.1–2.2)
ALP SERPL-CCNC: 98 U/L (ref 45–117)
ALT SERPL-CCNC: 33 U/L (ref 12–78)
ANION GAP SERPL CALC-SCNC: 6 MMOL/L (ref 5–15)
AST SERPL-CCNC: 23 U/L (ref 15–37)
BASOPHILS # BLD: 0 K/UL (ref 0–0.1)
BASOPHILS NFR BLD: 0 % (ref 0–1)
BILIRUB SERPL-MCNC: 0.6 MG/DL (ref 0.2–1)
BUN SERPL-MCNC: 22 MG/DL (ref 6–20)
BUN/CREAT SERPL: 31 (ref 12–20)
CALCIUM SERPL-MCNC: 8.7 MG/DL (ref 8.5–10.1)
CHLORIDE SERPL-SCNC: 99 MMOL/L (ref 97–108)
CO2 SERPL-SCNC: 26 MMOL/L (ref 21–32)
CREAT SERPL-MCNC: 0.7 MG/DL (ref 0.7–1.3)
CRP SERPL-MCNC: 1.63 MG/DL (ref 0–0.6)
D DIMER PPP FEU-MCNC: 2.24 MG/L FEU (ref 0–0.65)
DIFFERENTIAL METHOD BLD: ABNORMAL
EOSINOPHIL # BLD: 0 K/UL (ref 0–0.4)
EOSINOPHIL NFR BLD: 0 % (ref 0–7)
ERYTHROCYTE [DISTWIDTH] IN BLOOD BY AUTOMATED COUNT: 11.3 % (ref 11.5–14.5)
FERRITIN SERPL-MCNC: 1095 NG/ML (ref 26–388)
GLOBULIN SER CALC-MCNC: 4.2 G/DL (ref 2–4)
GLUCOSE BLD STRIP.AUTO-MCNC: 294 MG/DL (ref 65–117)
GLUCOSE BLD STRIP.AUTO-MCNC: 313 MG/DL (ref 65–117)
GLUCOSE BLD STRIP.AUTO-MCNC: 355 MG/DL (ref 65–117)
GLUCOSE BLD STRIP.AUTO-MCNC: 389 MG/DL (ref 65–117)
GLUCOSE SERPL-MCNC: 342 MG/DL (ref 65–100)
HCT VFR BLD AUTO: 40.8 % (ref 36.6–50.3)
HGB BLD-MCNC: 14.3 G/DL (ref 12.1–17)
IMM GRANULOCYTES # BLD AUTO: 0.2 K/UL (ref 0–0.04)
IMM GRANULOCYTES NFR BLD AUTO: 2 % (ref 0–0.5)
LYMPHOCYTES # BLD: 1.1 K/UL (ref 0.8–3.5)
LYMPHOCYTES NFR BLD: 14 % (ref 12–49)
MCH RBC QN AUTO: 29 PG (ref 26–34)
MCHC RBC AUTO-ENTMCNC: 35 G/DL (ref 30–36.5)
MCV RBC AUTO: 82.8 FL (ref 80–99)
MONOCYTES # BLD: 0.6 K/UL (ref 0–1)
MONOCYTES NFR BLD: 7 % (ref 5–13)
NEUTS SEG # BLD: 6.2 K/UL (ref 1.8–8)
NEUTS SEG NFR BLD: 77 % (ref 32–75)
NRBC # BLD: 0 K/UL (ref 0–0.01)
NRBC BLD-RTO: 0 PER 100 WBC
PLATELET # BLD AUTO: 622 K/UL (ref 150–400)
PMV BLD AUTO: 9.2 FL (ref 8.9–12.9)
POTASSIUM SERPL-SCNC: 5.3 MMOL/L (ref 3.5–5.1)
PROT SERPL-MCNC: 6.9 G/DL (ref 6.4–8.2)
RBC # BLD AUTO: 4.93 M/UL (ref 4.1–5.7)
RBC MORPH BLD: ABNORMAL
SERVICE CMNT-IMP: ABNORMAL
SODIUM SERPL-SCNC: 131 MMOL/L (ref 136–145)
WBC # BLD AUTO: 8.1 K/UL (ref 4.1–11.1)

## 2021-12-09 PROCEDURE — 94664 DEMO&/EVAL PT USE INHALER: CPT

## 2021-12-09 PROCEDURE — 80053 COMPREHEN METABOLIC PANEL: CPT

## 2021-12-09 PROCEDURE — 94640 AIRWAY INHALATION TREATMENT: CPT

## 2021-12-09 PROCEDURE — 86140 C-REACTIVE PROTEIN: CPT

## 2021-12-09 PROCEDURE — 36415 COLL VENOUS BLD VENIPUNCTURE: CPT

## 2021-12-09 PROCEDURE — 85379 FIBRIN DEGRADATION QUANT: CPT

## 2021-12-09 PROCEDURE — 77010033711 HC HIGH FLOW OXYGEN

## 2021-12-09 PROCEDURE — 74011250636 HC RX REV CODE- 250/636: Performed by: INTERNAL MEDICINE

## 2021-12-09 PROCEDURE — 85025 COMPLETE CBC W/AUTO DIFF WBC: CPT

## 2021-12-09 PROCEDURE — 74011250637 HC RX REV CODE- 250/637: Performed by: INTERNAL MEDICINE

## 2021-12-09 PROCEDURE — 82728 ASSAY OF FERRITIN: CPT

## 2021-12-09 PROCEDURE — 94760 N-INVAS EAR/PLS OXIMETRY 1: CPT

## 2021-12-09 PROCEDURE — 65660000000 HC RM CCU STEPDOWN

## 2021-12-09 PROCEDURE — 74011636637 HC RX REV CODE- 636/637: Performed by: INTERNAL MEDICINE

## 2021-12-09 PROCEDURE — 82962 GLUCOSE BLOOD TEST: CPT

## 2021-12-09 RX ADMIN — DEXAMETHASONE SODIUM PHOSPHATE 10 MG: 4 INJECTION, SOLUTION INTRAMUSCULAR; INTRAVENOUS at 08:44

## 2021-12-09 RX ADMIN — INSULIN HUMAN 25 UNITS: 100 INJECTION, SUSPENSION SUBCUTANEOUS at 08:44

## 2021-12-09 RX ADMIN — INSULIN LISPRO 7 UNITS: 100 INJECTION, SOLUTION INTRAVENOUS; SUBCUTANEOUS at 08:44

## 2021-12-09 RX ADMIN — BARICITINIB 4 MG: 2 TABLET, FILM COATED ORAL at 08:44

## 2021-12-09 RX ADMIN — DEXAMETHASONE SODIUM PHOSPHATE 10 MG: 4 INJECTION, SOLUTION INTRAMUSCULAR; INTRAVENOUS at 21:15

## 2021-12-09 RX ADMIN — ENOXAPARIN SODIUM 120 MG: 150 INJECTION SUBCUTANEOUS at 05:10

## 2021-12-09 RX ADMIN — IPRATROPIUM BROMIDE AND ALBUTEROL 1 PUFF: 20; 100 SPRAY, METERED RESPIRATORY (INHALATION) at 20:12

## 2021-12-09 RX ADMIN — INSULIN LISPRO 16 UNITS: 100 INJECTION, SOLUTION INTRAVENOUS; SUBCUTANEOUS at 12:33

## 2021-12-09 RX ADMIN — Medication 10 ML: at 08:48

## 2021-12-09 RX ADMIN — INSULIN LISPRO 18 UNITS: 100 INJECTION, SOLUTION INTRAVENOUS; SUBCUTANEOUS at 17:45

## 2021-12-09 RX ADMIN — Medication 10 ML: at 12:33

## 2021-12-09 RX ADMIN — Medication 10 ML: at 21:16

## 2021-12-09 RX ADMIN — ENOXAPARIN SODIUM 120 MG: 150 INJECTION SUBCUTANEOUS at 17:45

## 2021-12-09 RX ADMIN — INSULIN LISPRO 5 UNITS: 100 INJECTION, SOLUTION INTRAVENOUS; SUBCUTANEOUS at 21:21

## 2021-12-09 RX ADMIN — IPRATROPIUM BROMIDE AND ALBUTEROL 1 PUFF: 20; 100 SPRAY, METERED RESPIRATORY (INHALATION) at 13:37

## 2021-12-09 RX ADMIN — IPRATROPIUM BROMIDE AND ALBUTEROL 1 PUFF: 20; 100 SPRAY, METERED RESPIRATORY (INHALATION) at 02:34

## 2021-12-09 RX ADMIN — Medication 10 ML: at 05:10

## 2021-12-09 RX ADMIN — INSULIN HUMAN 25 UNITS: 100 INJECTION, SUSPENSION SUBCUTANEOUS at 17:45

## 2021-12-09 NOTE — PROGRESS NOTES
[facility] 79  [address] [facility], [address] [place] [address]  [phone]      Medical Progress Note      NAME: Christina Laurent   :  1985  MRM:  [id number]    Date/Time: 2021           Assessment / Plan:     #Acute respiratory failure with hypoxia ([address] (2021): Presented on nasal canula , but rapid worsening  requiring BiPAP. Improved to HFNC 50L, 80%. - Dex 10mg BID    - Baricitinib    - LMWH 1mg/kg   - IS, supportive care    #COVID-19 (2021): Unvaccinated. As above    #Bilat DVT:    - LMWH 1mg/kg     #Bilat LE dvts: continue therapeutic lovenox.      #Hyperglycemia/ Hx of DM:A1c 11%   - Increase glargine to 30u, resistant SSI   - BGs to ICU goals     #Obese: RF for poor course above    #Gallbladder polyp: f/u OP. Care Plan discussed with: Patient    Discussed:  Care Plan    Prophylaxis:  Lovenox    Disposition:  Home w/Family           ___________________________________________________    Attending Physician: Bird Spears MD        Subjective:     Chief Complaint:  Jamesetta Begun, tolerating HFNC well this morning    ROS:  (bold if positive, if negative)    Tolerating PT  Tolerating Diet          Objective:       Vitals:          Last 24hrs VS reviewed since prior progress note.  Most recent are:    Visit Vitals  /72   Pulse 64   Temp 97.5 °F (36.4 °C)   Resp 20   Ht 5' 5\" (1.651 m)   Wt 115.9 kg (255 lb 8.2 oz)   SpO2 95%   BMI 42.52 kg/m²     SpO2 Readings from Last 6 Encounters:   21 95%   20 97%    O2 Flow Rate (L/min): 50 l/min       Intake/Output Summary (Last 24 hours) at 2021 1923  Last data filed at 2021 2329  Gross per 24 hour   Intake    Output 800 ml   Net -800 ml          Exam:     Physical Exam:    Gen:  Well-developed, well-nourished, in no acute distress, obese  HEENT:  Pink conjunctivae, PERRL, hearing intact to voice, moist mucous membranes  Neck:  Supple, without masses, thyroid non-tender  Resp:  No accessory muscle use  Card:  No murmurs, normal S1, S2 without thrills, bruits or peripheral edema  Abd:  Soft, non-tender, non-distended, normoactive bowel sounds are present  Musc:  No cyanosis or clubbing  Skin:  No rashes or ulcers, skin turgor is good  Neuro:  Cranial nerves 3-12 are grossly intact,  strength is 5/5 bilaterally and dorsi / plantarflexion is 5/5 bilaterally, follows commands appropriately  Psych:  Good insight, oriented to person, place and time, alert         Medications Reviewed: (see below)    Lab Data Reviewed: (see below)    ______________________________________________________________________    Medications:     Current Facility-Administered Medications   Medication Dose Route Frequency    dextrose (D50W) injection syrg 12.5-25 g  25-50 mL IntraVENous PRN    insulin glargine (LANTUS) injection 35 Units  0.3 Units/kg SubCUTAneous QHS    insulin lispro (HUMALOG) injection   SubCUTAneous AC&HS    ipratropium-albuterol (COMBIVENT RESPIMAT) 20 mcg-100 mcg inhalation spray  1 Puff Inhalation Q6H RT    Or    albuterol-ipratropium (DUO-NEB) 2.5 MG-0.5 MG/3 ML  3 mL Nebulization Q6H RT    sodium chloride (NS) flush 5-40 mL  5-40 mL IntraVENous Q8H    sodium chloride (NS) flush 5-40 mL  5-40 mL IntraVENous PRN    acetaminophen (TYLENOL) tablet 650 mg  650 mg Oral Q6H PRN    Or    acetaminophen (TYLENOL) suppository 650 mg  650 mg Rectal Q6H PRN    polyethylene glycol (MIRALAX) packet 17 g  17 g Oral DAILY PRN    ondansetron (ZOFRAN ODT) tablet 4 mg  4 mg Oral Q8H PRN    Or    ondansetron (ZOFRAN) injection 4 mg  4 mg IntraVENous Q6H PRN    glucose chewable tablet 16 g  4 Tablet Oral PRN    dextrose (D50W) injection syrg 12.5-25 g  12.5-25 g IntraVENous PRN    glucagon (GLUCAGEN) injection 1 mg  1 mg IntraMUSCular PRN    baricitinib (OLUMIANT) tablet 4 mg  4 mg Oral DAILY    dexamethasone (DECADRON) 4 mg/mL injection 10 mg  10 mg IntraVENous Q12H    enoxaparin (LOVENOX) injection 120 mg  1 mg/kg SubCUTAneous Q12H            Lab Review:     Recent Labs     12/08/21  0024 12/07/21  0029 12/06/21  0921   WBC 7.4 6.1 9.4   HGB 14.3 14.3 15.5   HCT 41.3 40.1 44.4   * 401* 412*     Recent Labs     12/08/21  0024 12/07/21  0029 12/06/21  0921   * 135* 133*   K 4.4 4.5 4.2    100 100   CO2 26 28 24   * 223* 247*   BUN 21* 11 12   CREA 0.85 0.73 0.73   CA 8.6 8.3* 8.5   ALB 2.5* 2.4* 2.6*   ALT 34 42 48     No components found for: [place]

## 2021-12-09 NOTE — PROGRESS NOTES
Bedside shift change report given to [facility] (oncoming nurse) by Hemal Banks (offgoing nurse). Report included the following information SBAR, Kardex, ED Summary, Intake/Output, and Recent Results. Bedside shift change report given to [address] (oncoming nurse) by [facility] (offgoing nurse). Report included the following information SBAR, Kardex, ED Summary, Intake/Output, and Recent Results. This patient was assisted with Intentional Toileting every 2 hours during this shift as appropriate. Documentation of ambulation and output reflected on Flowsheet as appropriate. Purposeful hourly rounding was completed using AIDET and 5Ps. Outcomes of PHR documented as they occurred. Bed alarm in use as appropriate. Dual Suction and ambubag in place.

## 2021-12-09 NOTE — PROGRESS NOTES
PULMONARY ASSOCIATES [facility]     Name: Karina Bowser MRN: [id number]   : 1985 Hospital: [address]   Date: 2021        Impression Plan   1. Acute hypoxemic respiratory failure  2. COVID 19 PNA  3. Obesity  4. VAPE user  5. Bilateral DVTs- CTA neg for PE               · Continue HF and wean as able to keep sats > 90%  · Cont. dexamethasone 10 mg q12h  · Continue baricitinib; trend LFTs  · Pt instructed to self prone 3 hrs a night and sit at side of bed  · OOB into chair  · Follow d-dimer and CRP  · Enoxaparin- full dose anticoagulation. Pt is critically ill. Critical care time spent with pt exclusive of procedures was 32 min minutes. Pt at risk for further decline due to acute respiratory failure due to COVID 19 PNA    Radiology  (personally reviewed) CXR reviewed: bilateral multilobar PNA  Chest CTA: Diffuse bilateral airspace disease and mediastinal adenopathy. No acute pulmonary embolus. Subjective     Cc: shortness of breath    44 yo with PMHx of DM presenting with increasing SOB 5 days ago. States he started with fevers about 7 days ago. COVID 19 positive. Former smoker, Ernestina Elise. No hx of lung problems. Unvaccinated. O2 requirement has increased since arriving in the ER. Currently on HF 30 L and 90% and sats of 92%. States that the right side of his chest hurts. Interval history  Afebrile  Sats 94% on 50L  D-dimer 2.24 - better  Ferritin 1095 - stable  Blood cx no growth x 3 days    BLE VD: positive for non-well attached deep venous thrombosis in the right proximal femoral vein. Acute occlusive thrombosis present in the left soleal vein. ECHO: EF 55-60%; walls. valves normal    ROS: Feeling better this morning. Denies fever or chills. Denies CP. Denies abd pain or LE pain/swelling. Not currently SOB with HF in place at rest.    Past Medical History:   Diagnosis Date    Diabetes [facility])       History reviewed. No pertinent surgical history.    Prior to Admission medications    Medication Sig Start Date End Date Taking? Authorizing Provider   benzonatate (TESSALON) 200 mg capsule Take 200 mg by mouth three (3) times daily as needed for Cough. Yes Provider, Historical   doxycycline (MONODOX) 100 mg capsule Take 100 mg by mouth two (2) times a day. Yes Provider, Historical   naproxen (NAPROSYN) 500 mg tablet Take 500 mg by mouth two (2) times daily (with meals). Yes Provider, Historical     Current Facility-Administered Medications   Medication Dose Route Frequency    insulin NPH (NOVOLIN N, HUMULIN N) injection 25 Units  25 Units SubCUTAneous ACB&D    insulin lispro (HUMALOG) injection   SubCUTAneous AC&HS    ipratropium-albuterol (COMBIVENT RESPIMAT) 20 mcg-100 mcg inhalation spray  1 Puff Inhalation Q6H RT    Or    albuterol-ipratropium (DUO-NEB) 2.5 MG-0.5 MG/3 ML  3 mL Nebulization Q6H RT    sodium chloride (NS) flush 5-40 mL  5-40 mL IntraVENous Q8H    baricitinib (OLUMIANT) tablet 4 mg  4 mg Oral DAILY    dexamethasone (DECADRON) 4 mg/mL injection 10 mg  10 mg IntraVENous Q12H    enoxaparin (LOVENOX) injection 120 mg  1 mg/kg SubCUTAneous Q12H     No Known Allergies   Social History     Tobacco Use    Smoking status: Current Every Day Smoker    Smokeless tobacco: Never Used   Substance Use Topics    Alcohol use: Not Currently     Comment: socially       Family History   Problem Relation Age of Onset    Diabetes Mother           Laboratory: I have personally reviewed the flowsheet and labs.      Recent Labs     12/09/21  0502 12/08/21  0024 12/07/21  0029   WBC 8.1 7.4 6.1   HGB 14.3 14.3 14.3   HCT 40.8 41.3 40.1   * 564* 401*     Recent Labs     12/09/21  0502 12/08/21  0024 12/07/21  0029   * 135* 135*   K 5.3* 4.4 4.5   CL 99 100 100   CO2 26 26 28   * 280* 223*   BUN 22* 21* 11   CREA 0.70 0.85 0.73   CA 8.7 8.6 8.3*   ALB 2.7* 2.5* 2.4*   ALT 33 34 42       Objective:     Visit Vitals  /71   Pulse 72   Temp 97.6 °F (36.4 °C)   Resp 18   Ht 5' 5\" (1.651 m)   Wt 115.9 kg (255 lb 8.2 oz)   SpO2 94%   BMI 42.52 kg/m²         Intake/Output Summary (Last 24 hours) at 12/9/2021 0954  Last data filed at 12/9/2021 0300  Gross per 24 hour   Intake 240 ml   Output 400 ml   Net -160 ml     EXAM:   GENERAL: awake, alert, on bipap, HEENT:  anicteric, EOMI, no alar flaring or epistaxis, oral mucosa moist without cyanosis, NECK:  no jugular vein distention, no retractions, no thyromegaly or masses, LUNGS: Diminished and coarse HEART:  Regular rate and rhythm with no MGR; no edema is present, ABDOMEN:  soft with no tenderness, bowel sounds present, EXTREMITIES:  warm with no cyanosis, SKIN:  no jaundice or ecchymosis and NEUROLOGIC:  alert and oriented, grossly non-focal    Lysle Raghu, PA  [facility]

## 2021-12-09 NOTE — PROGRESS NOTES
[facility] 79  380 [facility], [address] [place] [address]  [phone]      Medical Progress Note      NAME: Anali Goldsmith   :  1985  MRM:  [id number]    Date/Time: 2021           Assessment / Plan:     #Acute respiratory failure with hypoxia ([address] (2021): Presented on nasal canula , but rapid worsening  requiring BiPAP. Improved to HFNC 50L, 60%. - Dex 10mg BID    - Baricitinib    - LMWH 1mg/kg   - IS, supportive care    #COVID-19 (2021): Unvaccinated. As above    #Bilat DVT:    - LMWH 1mg/kg      #Hyperglycemia/ Hx of DM:A1c 11%   - Jesus glar 30u to NPH 25 BID, cont resistant SSI   - BGs to ICU goals     #Obese: RF for poor course above, but stable at bpresent    #Gallbladder polyp: f/u OP. Care Plan discussed with: Patient    Discussed:  Care Plan    Prophylaxis:  Lovenox    Disposition:  Home w/Family           ___________________________________________________    Attending Physician: Addi Rivero MD        Subjective:     Chief Complaint:  Alis Bahena, feels well today, a bit improved    ROS:  (bold if positive, if negative)    Tolerating PT  Tolerating Diet          Objective:       Vitals:          Last 24hrs VS reviewed since prior progress note.  Most recent are:    Visit Vitals  /75 (BP 1 Location: Left upper arm, BP Patient Position: At rest)   Pulse 71   Temp 98.9 °F (37.2 °C)   Resp 23   Ht 5' 5\" (1.651 m)   Wt 115.9 kg (255 lb 8.2 oz)   SpO2 92%   BMI 42.52 kg/m²     SpO2 Readings from Last 6 Encounters:   21 92%   20 97%    O2 Flow Rate (L/min): 50 l/min       Intake/Output Summary (Last 24 hours) at 2021 1608  Last data filed at 2021 0300  Gross per 24 hour   Intake 240 ml   Output 400 ml   Net -160 ml          Exam:     Physical Exam:    Gen:  Well-developed, well-nourished, in no acute distress, obese  HEENT:  Pink conjunctivae, PERRL, hearing intact to voice, moist mucous membranes  Neck:  Supple, without masses, thyroid non-tender  Resp:  No accessory muscle use  Card:  No murmurs, normal S1, S2 without thrills, bruits or peripheral edema  Abd:  Soft, non-tender, non-distended, normoactive bowel sounds are present  Musc:  No cyanosis or clubbing  Skin:  No rashes or ulcers, skin turgor is good  Neuro:  Cranial nerves 3-12 are grossly intact,  strength is 5/5 bilaterally and dorsi / plantarflexion is 5/5 bilaterally, follows commands appropriately  Psych:  Good insight, oriented to person, place and time, alert         Medications Reviewed: (see below)    Lab Data Reviewed: (see below)    ______________________________________________________________________    Medications:     Current Facility-Administered Medications   Medication Dose Route Frequency    insulin NPH (NOVOLIN N, HUMULIN N) injection 25 Units  25 Units SubCUTAneous ACB&D    influenza vaccine 2021-22 (6 mos+)(PF) (FLUARIX/FLULAVAL/FLUZONE QUAD) injection 0.5 mL  1 Each IntraMUSCular PRIOR TO DISCHARGE    dextrose (D50W) injection syrg 12.5-25 g  25-50 mL IntraVENous PRN    insulin lispro (HUMALOG) injection   SubCUTAneous AC&HS    ipratropium-albuterol (COMBIVENT RESPIMAT) 20 mcg-100 mcg inhalation spray  1 Puff Inhalation Q6H RT    Or    albuterol-ipratropium (DUO-NEB) 2.5 MG-0.5 MG/3 ML  3 mL Nebulization Q6H RT    sodium chloride (NS) flush 5-40 mL  5-40 mL IntraVENous Q8H    sodium chloride (NS) flush 5-40 mL  5-40 mL IntraVENous PRN    acetaminophen (TYLENOL) tablet 650 mg  650 mg Oral Q6H PRN    Or    acetaminophen (TYLENOL) suppository 650 mg  650 mg Rectal Q6H PRN    polyethylene glycol (MIRALAX) packet 17 g  17 g Oral DAILY PRN    ondansetron (ZOFRAN ODT) tablet 4 mg  4 mg Oral Q8H PRN    Or    ondansetron (ZOFRAN) injection 4 mg  4 mg IntraVENous Q6H PRN    glucose chewable tablet 16 g  4 Tablet Oral PRN    dextrose (D50W) injection syrg 12.5-25 g  12.5-25 g IntraVENous PRN    glucagon (GLUCAGEN) injection 1 mg  1 mg IntraMUSCular PRN    baricitinib (OLUMIANT) tablet 4 mg  4 mg Oral DAILY    dexamethasone (DECADRON) 4 mg/mL injection 10 mg  10 mg IntraVENous Q12H    enoxaparin (LOVENOX) injection 120 mg  1 mg/kg SubCUTAneous Q12H            Lab Review:     Recent Labs     12/09/21  0502 12/08/21  0024 12/07/21  0029   WBC 8.1 7.4 6.1   HGB 14.3 14.3 14.3   HCT 40.8 41.3 40.1   * 564* 401*     Recent Labs     12/09/21  0502 12/08/21  0024 12/07/21  0029   * 135* 135*   K 5.3* 4.4 4.5   CL 99 100 100   CO2 26 26 28   * 280* 223*   BUN 22* 21* 11   CREA 0.70 0.85 0.73   CA 8.7 8.6 8.3*   ALB 2.7* 2.5* 2.4*   ALT 33 34 42     No components found for: [place]

## 2021-12-09 NOTE — PROGRESS NOTES
Transition of Care Plan: RUR-6%, covid+  1. CM  following  2. Pulmonology following-pt on 50L hi-inga O2  3. Complete [facility] Card application  4. Establish with PCP-list of [facility] providers given to pt  5. Girlfriend or friend to provide transport home at discharge  [facility] [place]

## 2021-12-10 LAB
COMMENT, HOLDF: NORMAL
FERRITIN SERPL-MCNC: 992 NG/ML (ref 26–388)
GLUCOSE BLD STRIP.AUTO-MCNC: 292 MG/DL (ref 65–117)
GLUCOSE BLD STRIP.AUTO-MCNC: 318 MG/DL (ref 65–117)
GLUCOSE BLD STRIP.AUTO-MCNC: 330 MG/DL (ref 65–117)
GLUCOSE BLD STRIP.AUTO-MCNC: 338 MG/DL (ref 65–117)
SAMPLES BEING HELD,HOLD: NORMAL
SERVICE CMNT-IMP: ABNORMAL

## 2021-12-10 PROCEDURE — 74011636637 HC RX REV CODE- 636/637: Performed by: INTERNAL MEDICINE

## 2021-12-10 PROCEDURE — 77010033711 HC HIGH FLOW OXYGEN

## 2021-12-10 PROCEDURE — 94664 DEMO&/EVAL PT USE INHALER: CPT

## 2021-12-10 PROCEDURE — 82728 ASSAY OF FERRITIN: CPT

## 2021-12-10 PROCEDURE — 82962 GLUCOSE BLOOD TEST: CPT

## 2021-12-10 PROCEDURE — 74011250637 HC RX REV CODE- 250/637: Performed by: INTERNAL MEDICINE

## 2021-12-10 PROCEDURE — 36415 COLL VENOUS BLD VENIPUNCTURE: CPT

## 2021-12-10 PROCEDURE — 65660000000 HC RM CCU STEPDOWN

## 2021-12-10 PROCEDURE — 74011000250 HC RX REV CODE- 250: Performed by: INTERNAL MEDICINE

## 2021-12-10 PROCEDURE — 94640 AIRWAY INHALATION TREATMENT: CPT

## 2021-12-10 PROCEDURE — 74011250636 HC RX REV CODE- 250/636: Performed by: INTERNAL MEDICINE

## 2021-12-10 RX ORDER — ENOXAPARIN SODIUM 100 MG/ML
1 INJECTION SUBCUTANEOUS EVERY 12 HOURS
Status: DISCONTINUED | OUTPATIENT
Start: 2021-12-10 | End: 2021-12-10

## 2021-12-10 RX ADMIN — DEXAMETHASONE SODIUM PHOSPHATE 10 MG: 4 INJECTION, SOLUTION INTRAMUSCULAR; INTRAVENOUS at 09:14

## 2021-12-10 RX ADMIN — INSULIN LISPRO 5 UNITS: 100 INJECTION, SOLUTION INTRAVENOUS; SUBCUTANEOUS at 21:40

## 2021-12-10 RX ADMIN — Medication 10 ML: at 21:40

## 2021-12-10 RX ADMIN — Medication 10 ML: at 06:51

## 2021-12-10 RX ADMIN — INSULIN LISPRO 10 UNITS: 100 INJECTION, SOLUTION INTRAVENOUS; SUBCUTANEOUS at 17:35

## 2021-12-10 RX ADMIN — APIXABAN 10 MG: 5 TABLET, FILM COATED ORAL at 17:34

## 2021-12-10 RX ADMIN — ENOXAPARIN SODIUM 120 MG: 150 INJECTION SUBCUTANEOUS at 06:23

## 2021-12-10 RX ADMIN — IPRATROPIUM BROMIDE AND ALBUTEROL 1 PUFF: 20; 100 SPRAY, METERED RESPIRATORY (INHALATION) at 14:27

## 2021-12-10 RX ADMIN — INSULIN LISPRO 10 UNITS: 100 INJECTION, SOLUTION INTRAVENOUS; SUBCUTANEOUS at 12:46

## 2021-12-10 RX ADMIN — BARICITINIB 4 MG: 2 TABLET, FILM COATED ORAL at 09:14

## 2021-12-10 RX ADMIN — DEXAMETHASONE SODIUM PHOSPHATE 10 MG: 4 INJECTION, SOLUTION INTRAMUSCULAR; INTRAVENOUS at 21:40

## 2021-12-10 RX ADMIN — INSULIN HUMAN 30 UNITS: 100 INJECTION, SUSPENSION SUBCUTANEOUS at 17:35

## 2021-12-10 RX ADMIN — IPRATROPIUM BROMIDE AND ALBUTEROL 1 PUFF: 20; 100 SPRAY, METERED RESPIRATORY (INHALATION) at 20:28

## 2021-12-10 RX ADMIN — INSULIN HUMAN 30 UNITS: 100 INJECTION, SUSPENSION SUBCUTANEOUS at 09:14

## 2021-12-10 RX ADMIN — INSULIN LISPRO 7 UNITS: 100 INJECTION, SOLUTION INTRAVENOUS; SUBCUTANEOUS at 09:14

## 2021-12-10 RX ADMIN — IPRATROPIUM BROMIDE AND ALBUTEROL SULFATE 3 ML: 2.5; .5 SOLUTION RESPIRATORY (INHALATION) at 08:08

## 2021-12-10 RX ADMIN — IPRATROPIUM BROMIDE AND ALBUTEROL 1 PUFF: 20; 100 SPRAY, METERED RESPIRATORY (INHALATION) at 01:24

## 2021-12-10 NOTE — PROGRESS NOTES
Comprehensive Nutrition Assessment      Type and Reason for Visit: Initial, RD nutrition re-screen/LOS    Nutrition Recommendations/Plan:   1. Adjusted diet to better meet needs  2. Added Glucerna BID for ease of kcal/protein intake. 3. Adjust insulin for BG, daily weights. 4. Add bowel regimen- no BM since admission. 5. Document PO intake. Nutrition Assessment:       Pt admitted for COVID-19 [U07.1]. Pt  has a past medical history of Diabetes ([facility] [address] .    Pt screened for LOS. Pt with no documented PO intake. Unsure current PO. Pt did not answer phone on attempted room phone call. BG elevated 2/2 A1c of 11.0 and decadron. , 292, 313, 389 mg/dL. Noted pt down 54 lbs over last ~1.5 years. Unsure if this is intentional and unsure actual time frame. Appreciate standing scale eight. Please document PO intake. NKFA. Wt Readings from Last 10 Encounters:   12/10/21 91.5 kg (201 lb 11.5 oz)   05/12/20 115.9 kg (255 lb 8.2 oz)       Malnutrition Assessment:  Malnutrition Status:  Insufficient data. Context:    No PO documented, wt loss w unsure time frame. Estimated Daily Nutrient Needs:  Energy (kcal): 1535; Weight Used for Energy Requirements: Current  Protein (g): 92 (1.0g/kg); Weight Used for Protein Requirements: Current  Fluid (ml/day): 2300; Method Used for Fluid Requirements: 1 ml/kcal    Documented meal intake:   No data found. Documented Supplement intake:  No data found. Nutrition Related Findings:     Last BM PTA?   Edema: None documented       Nutritionally Significant Medications:   Olumiant, Decadron, Lovenox, Humalog, NPH,       Wounds:    None       Current Nutrition Therapies:  ADULT ORAL NUTRITION SUPPLEMENT Dinner, Breakfast; Diabetic Supplement  ADULT DIET Regular; 4 carb choices (60 gm/meal)    Anthropometric Measures:  · Height:  5' 5\" (165.1 cm)  · Current Body Wt:  91.5 kg (201 lb 11.5 oz)   · Admission Body Wt:   201 lbs    · Usual Body Wt:   250 lbs     · Ideal Body Wt:  136 lbs:  148.3 %   · BMI Category:  Obese class 1 (BMI 30.0-34. 9)       Nutrition Diagnosis:   · Inadequate oral intake related to catabolic illness as evidenced by  (unsure PO intake)      Nutrition Interventions:   Food and/or Nutrient Delivery: Modify current diet, Start oral nutrition supplement  Nutrition Education and Counseling: No recommendations at this time  Coordination of Nutrition Care: Continue to monitor while inpatient, Interdisciplinary rounds    Goals:  PO >50% of meals and 1-2 ONS per day within 3-5 days       Nutrition Monitoring and Evaluation:   Behavioral-Environmental Outcomes: None identified  Food/Nutrient Intake Outcomes: Food and nutrient intake, Supplement intake  Physical Signs/Symptoms Outcomes: Biochemical data, Weight    Discharge Planning:    Continue oral nutrition supplement, Continue current diet     Electronically signed by Juana Vazquez, [address]     Contact: [phone]

## 2021-12-10 NOTE — PROGRESS NOTES
0700  Bedside and Verbal shift change report given to [facility] (oncoming nurse) by Sedrick Felty RN  (offgoing nurse). Report included the following information SBAR, Kardex, Procedure Summary, Intake/Output, MAR, Accordion, Recent Results and Cardiac Rhythm NSR. Initial assessment done. AOx4. Denies any pain. Assisted the patient to the recliner. Will continue to monitor. Visit Vitals  /78 (BP 1 Location: Left upper arm, BP Patient Position: At rest)   Pulse 62   Temp 98.8 °F (37.1 °C)   Resp 23   Ht 5' 5\" (1.651 m)   Wt 91.5 kg (201 lb 11.5 oz)   SpO2 93%   BMI 33.57 kg/m²     1738  Patient removing oxygen when eating. Reminded him the importance of oxygen and to keep it all the time. 1910  Bedside and Verbal shift change report given to Harinder Verduzco (oncoming nurse) by Bj Orozco RN (offgoing nurse). Report included the following information SBAR, Kardex, Procedure Summary, Intake/Output, MAR, Accordion and Recent Results.

## 2021-12-10 NOTE — PROGRESS NOTES
Karan Helton [facility] 79  [address] [facility], [address] [place] [address]  [phone]      Medical Progress Note      NAME: Lorelei Ragland   :  1985  MRM:  [id number]    Date/Time: 12/10/2021           Assessment / Plan:     #Acute respiratory failure with hypoxia ([address] (2021): Presented on nasal canula , but rapid worsening  requiring BiPAP. Improved to HFNC 40L, 45%. - Dex 10mg BID    - Baricitinib    - LMWH 1mg/kg to apix this evening   - IS, supportive care    #COVID-19 (2021): Unvaccinated. As above    #Bilat DVT:    - LMWH 1mg/kg   - Change to apixaban      #Hyperglycemia/ Hx of DM:A1c 11%   - Jesus glar 30u to NPH 30 BID, cont resistant SSI   - BGs to ICU goals     #Obese: RF for poor course above, but stable at bpresent    #Gallbladder polyp: f/u OP. Care Plan discussed with: Patient    Discussed:  Care Plan    Prophylaxis:  Lovenox    Disposition:  Home w/Family           ___________________________________________________    Attending Physician: Leonid Loving MD        Subjective:     Chief Complaint:  Osvaldo Schlatter, feels better each day; denies cough, sob;     ROS:  (bold if positive, if negative)    Tolerating PT  Tolerating Diet          Objective:       Vitals:          Last 24hrs VS reviewed since prior progress note.  Most recent are:    Visit Vitals  /79 (BP 1 Location: Left upper arm, BP Patient Position: At rest)   Pulse 77   Temp 97.9 °F (36.6 °C)   Resp 23   Ht 5' 5\" (1.651 m)   Wt 91.5 kg (201 lb 11.5 oz)   SpO2 96%   BMI 33.57 kg/m²     SpO2 Readings from Last 6 Encounters:   12/10/21 96%   20 97%    O2 Flow Rate (L/min): 40 l/min     No intake or output data in the 24 hours ending 12/10/21 2135       Exam:     Physical Exam:    Gen:  Well-developed, well-nourished, in no acute distress, obese  HEENT:  Pink conjunctivae, PERRL, hearing intact to voice, moist mucous membranes  Neck:  Supple, without masses, thyroid non-tender  Resp:  No accessory muscle use  Card:  No murmurs, normal S1, S2 without thrills, bruits or peripheral edema  Abd:  Soft, non-tender, non-distended, normoactive bowel sounds are present  Musc:  No cyanosis or clubbing  Skin:  No rashes or ulcers, skin turgor is good  Neuro:  Cranial nerves 3-12 are grossly intact,  strength is 5/5 bilaterally and dorsi / plantarflexion is 5/5 bilaterally, follows commands appropriately  Psych:  Good insight, oriented to person, place and time, alert         Medications Reviewed: (see below)    Lab Data Reviewed: (see below)    ______________________________________________________________________    Medications:     Current Facility-Administered Medications   Medication Dose Route Frequency    insulin NPH (NOVOLIN N, HUMULIN N) injection 30 Units  30 Units SubCUTAneous ACB&D    enoxaparin (LOVENOX) injection 90 mg  1 mg/kg SubCUTAneous Q12H    influenza vaccine 2021-22 (6 mos+)(PF) (FLUARIX/FLULAVAL/FLUZONE QUAD) injection 0.5 mL  1 Each IntraMUSCular PRIOR TO DISCHARGE    dextrose (D50W) injection syrg 12.5-25 g  25-50 mL IntraVENous PRN    insulin lispro (HUMALOG) injection   SubCUTAneous AC&HS    ipratropium-albuterol (COMBIVENT RESPIMAT) 20 mcg-100 mcg inhalation spray  1 Puff Inhalation Q6H RT    Or    albuterol-ipratropium (DUO-NEB) 2.5 MG-0.5 MG/3 ML  3 mL Nebulization Q6H RT    sodium chloride (NS) flush 5-40 mL  5-40 mL IntraVENous Q8H    sodium chloride (NS) flush 5-40 mL  5-40 mL IntraVENous PRN    acetaminophen (TYLENOL) tablet 650 mg  650 mg Oral Q6H PRN    Or    acetaminophen (TYLENOL) suppository 650 mg  650 mg Rectal Q6H PRN    polyethylene glycol (MIRALAX) packet 17 g  17 g Oral DAILY PRN    ondansetron (ZOFRAN ODT) tablet 4 mg  4 mg Oral Q8H PRN    Or    ondansetron (ZOFRAN) injection 4 mg  4 mg IntraVENous Q6H PRN    glucose chewable tablet 16 g  4 Tablet Oral PRN    glucagon (GLUCAGEN) injection 1 mg  1 mg IntraMUSCular PRN  baricitinib (OLUMIANT) tablet 4 mg  4 mg Oral DAILY    dexamethasone (DECADRON) 4 mg/mL injection 10 mg  10 mg IntraVENous Q12H            Lab Review:     Recent Labs     12/09/21  0502 12/08/21  0024   WBC 8.1 7.4   HGB 14.3 14.3   HCT 40.8 41.3   * 564*     Recent Labs     12/09/21  0502 12/08/21  0024   * 135*   K 5.3* 4.4   CL 99 100   CO2 26 26   * 280*   BUN 22* 21*   CREA 0.70 0.85   CA 8.7 8.6   ALB 2.7* 2.5*   ALT 33 34     No components found for: [place]

## 2021-12-10 NOTE — PROGRESS NOTES
[facility]     Name: Christina Laurent MRN: [id number]   : 1985 Hospital: [address]   Date: 12/10/2021        Impression Plan   1. Acute hypoxemic respiratory failure  2. COVID 19 PNA  3. Obesity  4. VAPE user  5. Bilateral DVTs- CTA neg for PE               · Continue HF and wean as able to keep sats > 90%  · Cont. dexamethasone 10 mg q12h  · Continue baricitinib; trend LFTs  · Combivent  · Pt instructed to self prone 3 hrs a night and sit at side of bed  · OOB into chair  · Follow d-dimer and CRP  · Enoxaparin- full dose anticoagulation. Pt is critically ill and requiring continuous HF O2. Critical care time spent with pt exclusive of procedures was 30+ minutes. Pt at risk for further decline due to acute respiratory failure due to COVID 19 PNA. Radiology  (personally reviewed) CXR reviewed: bilateral multilobar PNA  Chest CTA: Diffuse bilateral airspace disease and mediastinal adenopathy. No acute pulmonary embolus. Subjective     Cc: shortness of breath    44 yo with PMHx of DM presenting with increasing SOB 5 days ago. States he started with fevers about 7 days ago. COVID 19 positive. Former smoker, Margie Ringer. No hx of lung problems. Unvaccinated. O2 requirement has increased since arriving in the ER. Currently on HF 30 L and 90% and sats of 92%. States that the right side of his chest hurts. Interval history  Afebrile  Sats 92% on 45L FiO2 70%  CRP 1.63  Blood cx no growth x 4 days    BLE VD: positive for non-well attached deep venous thrombosis in the right proximal femoral vein. Acute occlusive thrombosis present in the left soleal vein. ECHO: EF 55-60%; walls. valves normal    ROS: Feeling about the same this morning. Denies fever or chills. Denies CP. Denies abd pain or LE pain/swelling. Not currently SOB with HF in place at rest.    Past Medical History:   Diagnosis Date    Diabetes [facility])       History reviewed.  No pertinent surgical history. Prior to Admission medications    Medication Sig Start Date End Date Taking? Authorizing Provider   benzonatate (TESSALON) 200 mg capsule Take 200 mg by mouth three (3) times daily as needed for Cough. Yes Provider, Historical   doxycycline (MONODOX) 100 mg capsule Take 100 mg by mouth two (2) times a day. Yes Provider, Historical   naproxen (NAPROSYN) 500 mg tablet Take 500 mg by mouth two (2) times daily (with meals). Yes Provider, Historical     Current Facility-Administered Medications   Medication Dose Route Frequency    insulin NPH (NOVOLIN N, HUMULIN N) injection 30 Units  30 Units SubCUTAneous ACB&D    influenza vaccine 2021-22 (6 mos+)(PF) (FLUARIX/FLULAVAL/FLUZONE QUAD) injection 0.5 mL  1 Each IntraMUSCular PRIOR TO DISCHARGE    insulin lispro (HUMALOG) injection   SubCUTAneous AC&HS    ipratropium-albuterol (COMBIVENT RESPIMAT) 20 mcg-100 mcg inhalation spray  1 Puff Inhalation Q6H RT    Or    albuterol-ipratropium (DUO-NEB) 2.5 MG-0.5 MG/3 ML  3 mL Nebulization Q6H RT    sodium chloride (NS) flush 5-40 mL  5-40 mL IntraVENous Q8H    baricitinib (OLUMIANT) tablet 4 mg  4 mg Oral DAILY    dexamethasone (DECADRON) 4 mg/mL injection 10 mg  10 mg IntraVENous Q12H    enoxaparin (LOVENOX) injection 120 mg  1 mg/kg SubCUTAneous Q12H     No Known Allergies   Social History     Tobacco Use    Smoking status: Current Every Day Smoker    Smokeless tobacco: Never Used   Substance Use Topics    Alcohol use: Not Currently     Comment: socially       Family History   Problem Relation Age of Onset    Diabetes Mother           Laboratory: I have personally reviewed the flowsheet and labs.      Recent Labs     12/09/21  0502 12/08/21  0024   WBC 8.1 7.4   HGB 14.3 14.3   HCT 40.8 41.3   * 564*     Recent Labs     12/09/21  0502 12/08/21  0024   * 135*   K 5.3* 4.4   CL 99 100   CO2 26 26   * 280*   BUN 22* 21*   CREA 0.70 0.85   CA 8.7 8.6   ALB 2.7* 2.5*   ALT 33 34       Objective:     Visit Vitals  /76 (BP 1 Location: Left upper arm, BP Patient Position: At rest)   Pulse (!) 53   Temp 98.2 °F (36.8 °C)   Resp 21   Ht 5' 5\" (1.651 m)   Wt 88.2 kg (194 lb 6.4 oz)   SpO2 92%   BMI 32.35 kg/m²       No intake or output data in the 24 hours ending 12/10/21 0816  EXAM:   GENERAL: awake, alert, on bipap, HEENT:  anicteric, EOMI, no alar flaring or epistaxis, oral mucosa moist without cyanosis, NECK:  no jugular vein distention, no retractions, no thyromegaly or masses, LUNGS: Diminished, clearer today HEART:  Regular rate and rhythm with no MGR; no edema is present, ABDOMEN:  soft with no tenderness, bowel sounds present, EXTREMITIES:  warm with no cyanosis, SKIN:  no jaundice or ecchymosis and NEUROLOGIC:  alert and oriented, grossly non-focal    BRANDIE Peterson  [facility]

## 2021-12-10 NOTE — PROGRESS NOTES
Transition of Care Plan: RUR-6%, covid+  1. CM  following  2. Pulmonology following-pt on 45L hi-inga O2  3. Complete [employer] application  4. Establish with PCP-list of [facility] providers given to pt  5. Girlfriend or friend to provide transport home at discharge  [facility] [employer]

## 2021-12-10 NOTE — PROGRESS NOTES
1915 Bedside and Verbal shift change report given to Annamaria RN (oncoming nurse) by Chad Wilkerson RN (offgoing nurse). Report included the following information SBAR, Kardex, Intake/Output, MAR, Recent Results and Cardiac Rhythm NSR. This patient was assisted with Intentional Toileting every 2 hours during this shift as appropriate. Documentation of ambulation and output reflected on Flowsheet as appropriate. Purposeful hourly rounding was completed using AIDET and 5Ps. Outcomes of PHR documented as they occurred. Bed alarm in use as appropriate. Dual Suction and ambubag in place. 0730 Bedside and Verbal shift change report given to Alana Dumas RN (oncoming nurse) by Logan Cramer RN (offgoing nurse). Report included the following information SBAR, Kardex, Intake/Output, MAR, Recent Results and Cardiac Rhythm NSR/SB.

## 2021-12-10 NOTE — PROGRESS NOTES
Problem: Diabetes Self-Management  Goal: *Disease process and treatment process  Description: Define diabetes and identify own type of diabetes; list 3 options for treating diabetes. Outcome: Progressing Towards Goal  Goal: *Incorporating nutritional management into lifestyle  Description: Describe effect of type, amount and timing of food on blood glucose; list 3 methods for planning meals. Outcome: Progressing Towards Goal  Goal: *Incorporating physical activity into lifestyle  Description: State effect of exercise on blood glucose levels. Outcome: Progressing Towards Goal  Goal: *Developing strategies to promote health/change behavior  Description: Define the ABC's of diabetes; identify appropriate screenings, schedule and personal plan for screenings. Outcome: Progressing Towards Goal  Goal: *Using medications safely  Description: State effect of diabetes medications on diabetes; name diabetes medication taking, action and side effects. Outcome: Progressing Towards Goal  Goal: *Monitoring blood glucose, interpreting and using results  Description: Identify recommended blood glucose targets  and personal targets. Outcome: Progressing Towards Goal  Goal: *Prevention, detection, treatment of acute complications  Description: List symptoms of hyper- and hypoglycemia; describe how to treat low blood sugar and actions for lowering  high blood glucose level. Outcome: Progressing Towards Goal  Goal: *Prevention, detection and treatment of chronic complications  Description: Define the natural course of diabetes and describe the relationship of blood glucose levels to long term complications of diabetes.   Outcome: Progressing Towards Goal  Goal: *Developing strategies to address psychosocial issues  Description: Describe feelings about living with diabetes; identify support needed and support network  Outcome: Progressing Towards Goal     Problem: Patient Education: Go to Patient Education Activity  Goal: Patient/Family Education  Outcome: Progressing Towards Goal     Problem: Falls - Risk of  Goal: *Absence of Falls  Description: Document Jalil Fall Risk and appropriate interventions in the flowsheet. Outcome: Progressing Towards Goal  Note: Fall Risk Interventions:            Medication Interventions: Patient to call before getting OOB, Teach patient to arise slowly                   Problem: Patient Education: Go to Patient Education Activity  Goal: Patient/Family Education  Outcome: Progressing Towards Goal     Problem: Airway Clearance - Ineffective  Goal: Achieve or maintain patent airway  Outcome: Progressing Towards Goal     Problem: Gas Exchange - Impaired  Goal: Absence of hypoxia  Outcome: Progressing Towards Goal  Goal: Promote optimal lung function  Outcome: Progressing Towards Goal     Problem: Breathing Pattern - Ineffective  Goal: Ability to achieve and maintain a regular respiratory rate  Outcome: Progressing Towards Goal     Problem:  Body Temperature -  Risk of, Imbalanced  Goal: Ability to maintain a body temperature within defined limits  Outcome: Progressing Towards Goal  Goal: Will regain or maintain usual level of consciousness  Outcome: Progressing Towards Goal  Goal: Complications related to the disease process, condition or treatment will be avoided or minimized  Outcome: Progressing Towards Goal     Problem: Isolation Precautions - Risk of Spread of Infection  Goal: Prevent transmission of infectious organism to others  Outcome: Progressing Towards Goal     Problem: Nutrition Deficits  Goal: Optimize nutrtional status  Outcome: Progressing Towards Goal     Problem: Risk for Fluid Volume Deficit  Goal: Maintain normal heart rhythm  Outcome: Progressing Towards Goal     Problem: Loneliness or Risk for Loneliness  Goal: Demonstrate positive use of time alone when socialization is not possible  Outcome: Progressing Towards Goal     Problem: Fatigue  Goal: Verbalize increase energy and improved vitality  Outcome: Progressing Towards Goal     Problem: Patient Education: Go to Patient Education Activity  Goal: Patient/Family Education  Outcome: Progressing Towards Goal

## 2021-12-11 LAB
ALBUMIN SERPL-MCNC: 2.7 G/DL (ref 3.5–5)
ALBUMIN/GLOB SERPL: 0.6 {RATIO} (ref 1.1–2.2)
ALP SERPL-CCNC: 84 U/L (ref 45–117)
ALT SERPL-CCNC: 32 U/L (ref 12–78)
ANION GAP SERPL CALC-SCNC: 6 MMOL/L (ref 5–15)
AST SERPL-CCNC: 13 U/L (ref 15–37)
BASOPHILS # BLD: 0 K/UL (ref 0–0.1)
BASOPHILS NFR BLD: 0 % (ref 0–1)
BILIRUB SERPL-MCNC: 0.7 MG/DL (ref 0.2–1)
BUN SERPL-MCNC: 17 MG/DL (ref 6–20)
BUN/CREAT SERPL: 25 (ref 12–20)
CALCIUM SERPL-MCNC: 8.7 MG/DL (ref 8.5–10.1)
CHLORIDE SERPL-SCNC: 99 MMOL/L (ref 97–108)
CO2 SERPL-SCNC: 25 MMOL/L (ref 21–32)
CREAT SERPL-MCNC: 0.67 MG/DL (ref 0.7–1.3)
CRP SERPL-MCNC: 0.42 MG/DL (ref 0–0.6)
D DIMER PPP FEU-MCNC: 1.41 MG/L FEU (ref 0–0.65)
DIFFERENTIAL METHOD BLD: ABNORMAL
EOSINOPHIL # BLD: 0 K/UL (ref 0–0.4)
EOSINOPHIL NFR BLD: 0 % (ref 0–7)
ERYTHROCYTE [DISTWIDTH] IN BLOOD BY AUTOMATED COUNT: 11 % (ref 11.5–14.5)
FERRITIN SERPL-MCNC: 931 NG/ML (ref 26–388)
GLOBULIN SER CALC-MCNC: 4.2 G/DL (ref 2–4)
GLUCOSE BLD STRIP.AUTO-MCNC: 270 MG/DL (ref 65–117)
GLUCOSE BLD STRIP.AUTO-MCNC: 284 MG/DL (ref 65–117)
GLUCOSE BLD STRIP.AUTO-MCNC: 406 MG/DL (ref 65–117)
GLUCOSE BLD STRIP.AUTO-MCNC: 414 MG/DL (ref 65–117)
GLUCOSE SERPL-MCNC: 294 MG/DL (ref 65–100)
HCT VFR BLD AUTO: 42.3 % (ref 36.6–50.3)
HGB BLD-MCNC: 15 G/DL (ref 12.1–17)
IMM GRANULOCYTES # BLD AUTO: 0.2 K/UL (ref 0–0.04)
IMM GRANULOCYTES NFR BLD AUTO: 2 % (ref 0–0.5)
LYMPHOCYTES # BLD: 1.3 K/UL (ref 0.8–3.5)
LYMPHOCYTES NFR BLD: 17 % (ref 12–49)
MCH RBC QN AUTO: 29.5 PG (ref 26–34)
MCHC RBC AUTO-ENTMCNC: 35.5 G/DL (ref 30–36.5)
MCV RBC AUTO: 83.3 FL (ref 80–99)
MONOCYTES # BLD: 0.5 K/UL (ref 0–1)
MONOCYTES NFR BLD: 6 % (ref 5–13)
NEUTS SEG # BLD: 5.9 K/UL (ref 1.8–8)
NEUTS SEG NFR BLD: 75 % (ref 32–75)
NRBC # BLD: 0 K/UL (ref 0–0.01)
NRBC BLD-RTO: 0 PER 100 WBC
PLATELET # BLD AUTO: 657 K/UL (ref 150–400)
PMV BLD AUTO: 9.4 FL (ref 8.9–12.9)
POTASSIUM SERPL-SCNC: 4.5 MMOL/L (ref 3.5–5.1)
PROT SERPL-MCNC: 6.9 G/DL (ref 6.4–8.2)
RBC # BLD AUTO: 5.08 M/UL (ref 4.1–5.7)
RBC MORPH BLD: ABNORMAL
SERVICE CMNT-IMP: ABNORMAL
SODIUM SERPL-SCNC: 130 MMOL/L (ref 136–145)
WBC # BLD AUTO: 7.9 K/UL (ref 4.1–11.1)

## 2021-12-11 PROCEDURE — 74011250637 HC RX REV CODE- 250/637: Performed by: INTERNAL MEDICINE

## 2021-12-11 PROCEDURE — 82728 ASSAY OF FERRITIN: CPT

## 2021-12-11 PROCEDURE — 74011250636 HC RX REV CODE- 250/636: Performed by: INTERNAL MEDICINE

## 2021-12-11 PROCEDURE — 94640 AIRWAY INHALATION TREATMENT: CPT

## 2021-12-11 PROCEDURE — 74011636637 HC RX REV CODE- 636/637: Performed by: INTERNAL MEDICINE

## 2021-12-11 PROCEDURE — 85379 FIBRIN DEGRADATION QUANT: CPT

## 2021-12-11 PROCEDURE — 65270000029 HC RM PRIVATE

## 2021-12-11 PROCEDURE — 77010033711 HC HIGH FLOW OXYGEN

## 2021-12-11 PROCEDURE — 80053 COMPREHEN METABOLIC PANEL: CPT

## 2021-12-11 PROCEDURE — 86140 C-REACTIVE PROTEIN: CPT

## 2021-12-11 PROCEDURE — 36415 COLL VENOUS BLD VENIPUNCTURE: CPT

## 2021-12-11 PROCEDURE — 82962 GLUCOSE BLOOD TEST: CPT

## 2021-12-11 PROCEDURE — 85025 COMPLETE CBC W/AUTO DIFF WBC: CPT

## 2021-12-11 RX ORDER — DEXAMETHASONE SODIUM PHOSPHATE 4 MG/ML
10 INJECTION, SOLUTION INTRA-ARTICULAR; INTRALESIONAL; INTRAMUSCULAR; INTRAVENOUS; SOFT TISSUE EVERY 24 HOURS
Status: DISCONTINUED | OUTPATIENT
Start: 2021-12-11 | End: 2021-12-12

## 2021-12-11 RX ORDER — INSULIN LISPRO 100 [IU]/ML
7 INJECTION, SOLUTION INTRAVENOUS; SUBCUTANEOUS ONCE
Status: COMPLETED | OUTPATIENT
Start: 2021-12-11 | End: 2021-12-11

## 2021-12-11 RX ADMIN — DEXAMETHASONE SODIUM PHOSPHATE 10 MG: 4 INJECTION, SOLUTION INTRAMUSCULAR; INTRAVENOUS at 08:25

## 2021-12-11 RX ADMIN — Medication 10 ML: at 06:56

## 2021-12-11 RX ADMIN — Medication 10 ML: at 21:30

## 2021-12-11 RX ADMIN — INSULIN LISPRO 7 UNITS: 100 INJECTION, SOLUTION INTRAVENOUS; SUBCUTANEOUS at 21:30

## 2021-12-11 RX ADMIN — IPRATROPIUM BROMIDE AND ALBUTEROL 1 PUFF: 20; 100 SPRAY, METERED RESPIRATORY (INHALATION) at 08:26

## 2021-12-11 RX ADMIN — IPRATROPIUM BROMIDE AND ALBUTEROL 1 PUFF: 20; 100 SPRAY, METERED RESPIRATORY (INHALATION) at 15:13

## 2021-12-11 RX ADMIN — INSULIN LISPRO 7 UNITS: 100 INJECTION, SOLUTION INTRAVENOUS; SUBCUTANEOUS at 08:25

## 2021-12-11 RX ADMIN — INSULIN LISPRO 16 UNITS: 100 INJECTION, SOLUTION INTRAVENOUS; SUBCUTANEOUS at 16:56

## 2021-12-11 RX ADMIN — IPRATROPIUM BROMIDE AND ALBUTEROL 1 PUFF: 20; 100 SPRAY, METERED RESPIRATORY (INHALATION) at 19:45

## 2021-12-11 RX ADMIN — IPRATROPIUM BROMIDE AND ALBUTEROL 1 PUFF: 20; 100 SPRAY, METERED RESPIRATORY (INHALATION) at 02:02

## 2021-12-11 RX ADMIN — INSULIN HUMAN 30 UNITS: 100 INJECTION, SUSPENSION SUBCUTANEOUS at 08:25

## 2021-12-11 RX ADMIN — APIXABAN 10 MG: 5 TABLET, FILM COATED ORAL at 16:58

## 2021-12-11 RX ADMIN — INSULIN HUMAN 30 UNITS: 100 INJECTION, SUSPENSION SUBCUTANEOUS at 16:56

## 2021-12-11 RX ADMIN — APIXABAN 10 MG: 5 TABLET, FILM COATED ORAL at 08:25

## 2021-12-11 RX ADMIN — Medication 10 ML: at 16:56

## 2021-12-11 RX ADMIN — BARICITINIB 4 MG: 2 TABLET, FILM COATED ORAL at 08:25

## 2021-12-11 RX ADMIN — INSULIN LISPRO 7 UNITS: 100 INJECTION, SOLUTION INTRAVENOUS; SUBCUTANEOUS at 11:21

## 2021-12-11 NOTE — PROGRESS NOTES
0700  Bedside and Verbal shift change report given to Kacie Dale (oncoming nurse) by [facility] (offgoing nurse). Report included the following information SBAR, Kardex, Procedure Summary, Intake/Output, MAR, Accordion, Recent Results and Cardiac Rhythm NSR. Initial assessment done. AOx4. Assisted the patient to recliner. Denies any pain. Goal for today is to wean off oxygen. Visit Vitals  /68 (BP 1 Location: Left upper arm, BP Patient Position: At rest)   Pulse 70   Temp 98.2 °F (36.8 °C)   Resp (!) 31   Ht 5' 5\" (1.651 m)   Wt 91.5 kg (201 lb 11.5 oz)   SpO2 95%   BMI 33.57 kg/m²      1920  Bedside and Verbal shift change report given to Guero Larson (oncoming nurse) by Melodie Méndez RN (offgoing nurse). Report included the following information SBAR, Kardex, Procedure Summary, Intake/Output, MAR, Accordion and Recent Results.

## 2021-12-11 NOTE — PROGRESS NOTES
Problem: Falls - Risk of  Goal: *Absence of Falls  Description: Document Shon Pierre Fall Risk and appropriate interventions in the flowsheet.   Outcome: Progressing Towards Goal  Note: Fall Risk Interventions:            Medication Interventions: Bed/chair exit alarm, Patient to call before getting OOB, Teach patient to arise slowly    Elimination Interventions: Patient to call for help with toileting needs, Toilet paper/wipes in reach, Toileting schedule/hourly rounds, Urinal in reach              Problem: Patient Education: Go to Patient Education Activity  Goal: Patient/Family Education  Outcome: Progressing Towards Goal     Problem: Airway Clearance - Ineffective  Goal: Achieve or maintain patent airway  Outcome: Progressing Towards Goal     Problem: Gas Exchange - Impaired  Goal: Absence of hypoxia  Outcome: Progressing Towards Goal  Goal: Promote optimal lung function  Outcome: Progressing Towards Goal     Problem: Breathing Pattern - Ineffective  Goal: Ability to achieve and maintain a regular respiratory rate  Outcome: Progressing Towards Goal

## 2021-12-11 NOTE — PROGRESS NOTES
Karan Helton [facility] 79  [address] [facility], [address] [place] [address]  [phone]      Medical Progress Note      NAME: Lindie Gaucher   :  1985  MRM:  [id number]    Date/Time: 2021           Assessment / Plan:     #Acute respiratory failure with hypoxia ([address] (2021): Presented on nasal canula , but rapid worsening  requiring BiPAP. Improved to HFNC and now on 4L nc today. Anticipate several more days inpt while weaning steroids   - Dex 10mg BID -10    - To daily today   - Baricitinib    - LMWH 1mg/kg to apix 12/10   - IS, supportive care    #COVID-19 (2021): Unvaccinated. As above    #Bilat DVT:    - LMWH 1mg/kg   - Changed to apixaban      #Hyperglycemia/ Hx of DM:A1c 11%   - Cont NPH 30 BID, cont resistant SSI    - Plan to transition to glargine, SSI prior to DC and will need DM education w/ insulin   - BGs to ICU goals     #Obese: RF for poor course above, but stable at present    #Gallbladder polyp: f/u OP. Care Plan discussed with: Patient    Discussed:  Care Plan    Prophylaxis:  Lovenox    Disposition:  Home w/Family           ___________________________________________________    Attending Physician: Dion Roach MD        Subjective:     Chief Complaint:  Chapin Sermons, feels great today, no complaints    ROS:  (bold if positive, if negative)    Tolerating PT  Tolerating Diet          Objective:       Vitals:          Last 24hrs VS reviewed since prior progress note.  Most recent are:    Visit Vitals  /82 (BP 1 Location: Left upper arm, BP Patient Position: At rest)   Pulse 72   Temp 98.1 °F (36.7 °C)   Resp 15   Ht 5' 5\" (1.651 m)   Wt 91.5 kg (201 lb 11.5 oz)   SpO2 94%   BMI 33.57 kg/m²     SpO2 Readings from Last 6 Encounters:   21 94%   20 97%    O2 Flow Rate (L/min): 4 l/min       Intake/Output Summary (Last 24 hours) at 2021 1340  Last data filed at 2021 1110  Gross per 24 hour   Intake 480 ml   Output 1500 ml   Net -1020 ml          Exam:     Physical Exam:    Gen:  Well-developed, well-nourished, in no acute distress, obese  HEENT:  Pink conjunctivae, PERRL, hearing intact to voice, moist mucous membranes  Neck:  Supple, without masses, thyroid non-tender  Resp:  No accessory muscle use  Card:  No murmurs, normal S1, S2 without thrills, bruits or peripheral edema  Abd:  Soft, non-tender, non-distended, normoactive bowel sounds are present  Musc:  No cyanosis or clubbing  Skin:  No rashes or ulcers, skin turgor is good  Neuro:  Cranial nerves 3-12 are grossly intact,  strength is 5/5 bilaterally and dorsi / plantarflexion is 5/5 bilaterally, follows commands appropriately  Psych:  Good insight, oriented to person, place and time, alert         Medications Reviewed: (see below)    Lab Data Reviewed: (see below)    ______________________________________________________________________    Medications:     Current Facility-Administered Medications   Medication Dose Route Frequency    dexamethasone (DECADRON) 4 mg/mL injection 10 mg  10 mg IntraVENous Q24H    insulin NPH (NOVOLIN N, HUMULIN N) injection 30 Units  30 Units SubCUTAneous ACB&D    apixaban (ELIQUIS) tablet 10 mg  10 mg Oral BID    [START ON 12/13/2021] apixaban (ELIQUIS) tablet 5 mg  5 mg Oral BID    influenza vaccine 2021-22 (6 mos+)(PF) (FLUARIX/FLULAVAL/FLUZONE QUAD) injection 0.5 mL  1 Each IntraMUSCular PRIOR TO DISCHARGE    dextrose (D50W) injection syrg 12.5-25 g  25-50 mL IntraVENous PRN    insulin lispro (HUMALOG) injection   SubCUTAneous AC&HS    ipratropium-albuterol (COMBIVENT RESPIMAT) 20 mcg-100 mcg inhalation spray  1 Puff Inhalation Q6H RT    Or    albuterol-ipratropium (DUO-NEB) 2.5 MG-0.5 MG/3 ML  3 mL Nebulization Q6H RT    sodium chloride (NS) flush 5-40 mL  5-40 mL IntraVENous Q8H    sodium chloride (NS) flush 5-40 mL  5-40 mL IntraVENous PRN    acetaminophen (TYLENOL) tablet 650 mg  650 mg Oral Q6H PRN Or    acetaminophen (TYLENOL) suppository 650 mg  650 mg Rectal Q6H PRN    polyethylene glycol (MIRALAX) packet 17 g  17 g Oral DAILY PRN    ondansetron (ZOFRAN ODT) tablet 4 mg  4 mg Oral Q8H PRN    Or    ondansetron (ZOFRAN) injection 4 mg  4 mg IntraVENous Q6H PRN    glucose chewable tablet 16 g  4 Tablet Oral PRN    glucagon (GLUCAGEN) injection 1 mg  1 mg IntraMUSCular PRN    baricitinib (OLUMIANT) tablet 4 mg  4 mg Oral DAILY            Lab Review:     Recent Labs     12/11/21 0459 12/09/21  0502   WBC 7.9 8.1   HGB 15.0 14.3   HCT 42.3 40.8   * 622*     Recent Labs     12/11/21 0459 12/09/21  0502   * 131*   K 4.5 5.3*   CL 99 99   CO2 25 26   * 342*   BUN 17 22*   CREA 0.67* 0.70   CA 8.7 8.7   ALB 2.7* 2.7*   ALT 32 33     No components found for: [place]

## 2021-12-11 NOTE — PROGRESS NOTES
[facility]     Name: Lily Prater MRN: [id number]   : 1985 Hospital: [address]   Date: 2021        Impression Plan   1. Acute hypoxemic respiratory failure  2. COVID 19 PNA  3. Obesity  4. VAPE user  5. Bilateral DVTs- CTA neg for PE               · Continue HF and wean as able to keep sats > 90%  · Cont. dexamethasone 10 mg q12h  · Continue baricitinib; trend LFTs  · Combivent  · Pt instructed to self prone 3 hrs a night and sit at side of bed  · OOB into chair  · Follow d-dimer and CRP  · Enoxaparin- full dose anticoagulation. · Prn ; now on off high flow              Radiology  (personally reviewed) CXR reviewed: bilateral multilobar PNA  Chest CTA: Diffuse bilateral airspace disease and mediastinal adenopathy. No acute pulmonary embolus. Subjective     Cc: shortness of breath    42 yo with PMHx of DM presenting with increasing SOB 5 days ago. States he started with fevers about 7 days ago. COVID 19 positive. Former smoker, [place]. No hx of lung problems. Unvaccinated. O2 requirement has increased since arriving in the ER. Currently on HF 30 L and 90% and sats of 92%. States that the right side of his chest hurts. Interval history  Afebrile  Sats 94% on 12lpm  CRP 0.42  Blood cx no growth x 4 days    BLE VD: positive for non-well attached deep venous thrombosis in the right proximal femoral vein. Acute occlusive thrombosis present in the left soleal vein. ECHO: EF 55-60%; walls. valves normal    ROS: Feeling about the same this morning. Denies fever or chills. Denies CP. Denies abd pain or LE pain/swelling. Not currently SOB with HF in place at rest.    Past Medical History:   Diagnosis Date    Diabetes [facility])       History reviewed. No pertinent surgical history. Prior to Admission medications    Medication Sig Start Date End Date Taking?  Authorizing Provider   benzonatate (TESSALON) 200 mg capsule Take 200 mg by mouth three (3) times daily as needed for Cough. Yes Provider, Historical   doxycycline (MONODOX) 100 mg capsule Take 100 mg by mouth two (2) times a day. Yes Provider, Historical   naproxen (NAPROSYN) 500 mg tablet Take 500 mg by mouth two (2) times daily (with meals). Yes Provider, Historical     Current Facility-Administered Medications   Medication Dose Route Frequency    dexamethasone (DECADRON) 4 mg/mL injection 10 mg  10 mg IntraVENous Q24H    insulin NPH (NOVOLIN N, HUMULIN N) injection 30 Units  30 Units SubCUTAneous ACB&D    apixaban (ELIQUIS) tablet 10 mg  10 mg Oral BID    [START ON 12/13/2021] apixaban (ELIQUIS) tablet 5 mg  5 mg Oral BID    influenza vaccine 2021-22 (6 mos+)(PF) (FLUARIX/FLULAVAL/FLUZONE QUAD) injection 0.5 mL  1 Each IntraMUSCular PRIOR TO DISCHARGE    insulin lispro (HUMALOG) injection   SubCUTAneous AC&HS    ipratropium-albuterol (COMBIVENT RESPIMAT) 20 mcg-100 mcg inhalation spray  1 Puff Inhalation Q6H RT    Or    albuterol-ipratropium (DUO-NEB) 2.5 MG-0.5 MG/3 ML  3 mL Nebulization Q6H RT    sodium chloride (NS) flush 5-40 mL  5-40 mL IntraVENous Q8H    baricitinib (OLUMIANT) tablet 4 mg  4 mg Oral DAILY     No Known Allergies   Social History     Tobacco Use    Smoking status: Current Every Day Smoker    Smokeless tobacco: Never Used   Substance Use Topics    Alcohol use: Not Currently     Comment: socially       Family History   Problem Relation Age of Onset    Diabetes Mother           Laboratory: I have personally reviewed the flowsheet and labs.      Recent Labs     12/11/21  0459 12/09/21  0502   WBC 7.9 8.1   HGB 15.0 14.3   HCT 42.3 40.8   * 622*     Recent Labs     12/11/21  0459 12/09/21  0502   * 131*   K 4.5 5.3*   CL 99 99   CO2 25 26   * 342*   BUN 17 22*   CREA 0.67* 0.70   CA 8.7 8.7   ALB 2.7* 2.7*   ALT 32 33       Objective:     Visit Vitals  /86 (BP 1 Location: Left upper arm, BP Patient Position: At rest)   Pulse (!) 59   Temp 97.9 °F (36.6 °C)   Resp 25   Ht 5' 5\" (1.651 m)   Wt 91.5 kg (201 lb 11.5 oz)   SpO2 97%   BMI 33.57 kg/m²         Intake/Output Summary (Last 24 hours) at 12/11/2021 1030  Last data filed at 12/11/2021 0813  Gross per 24 hour   Intake 480 ml   Output 1500 ml   Net -1020 ml     EXAM:   GENERAL: awake, alert, on bipap, HEENT:  anicteric, EOMI, no alar flaring or epistaxis, oral mucosa moist without cyanosis, NECK:  no jugular vein distention, no retractions, no thyromegaly or masses, LUNGS: Diminished, clearer today HEART:  Regular rate and rhythm with no MGR; no edema is present, ABDOMEN:  soft with no tenderness, bowel sounds present, EXTREMITIES:  warm with no cyanosis, SKIN:  no jaundice or ecchymosis and NEUROLOGIC:  alert and oriented, grossly non-focal    Nehemiah Jones PA-C  [facility] [place]

## 2021-12-12 LAB
BACTERIA SPEC CULT: NORMAL
GLUCOSE BLD STRIP.AUTO-MCNC: 184 MG/DL (ref 65–117)
GLUCOSE BLD STRIP.AUTO-MCNC: 322 MG/DL (ref 65–117)
GLUCOSE BLD STRIP.AUTO-MCNC: 361 MG/DL (ref 65–117)
GLUCOSE BLD STRIP.AUTO-MCNC: 362 MG/DL (ref 65–117)
SERVICE CMNT-IMP: ABNORMAL
SERVICE CMNT-IMP: NORMAL

## 2021-12-12 PROCEDURE — 94640 AIRWAY INHALATION TREATMENT: CPT

## 2021-12-12 PROCEDURE — 94760 N-INVAS EAR/PLS OXIMETRY 1: CPT

## 2021-12-12 PROCEDURE — 65270000029 HC RM PRIVATE

## 2021-12-12 PROCEDURE — 77010033711 HC HIGH FLOW OXYGEN

## 2021-12-12 PROCEDURE — 74011250636 HC RX REV CODE- 250/636: Performed by: INTERNAL MEDICINE

## 2021-12-12 PROCEDURE — 74011250637 HC RX REV CODE- 250/637: Performed by: INTERNAL MEDICINE

## 2021-12-12 PROCEDURE — 74011636637 HC RX REV CODE- 636/637: Performed by: INTERNAL MEDICINE

## 2021-12-12 PROCEDURE — 82962 GLUCOSE BLOOD TEST: CPT

## 2021-12-12 RX ORDER — DEXAMETHASONE 6 MG/1
6 TABLET ORAL DAILY
Status: DISCONTINUED | OUTPATIENT
Start: 2021-12-13 | End: 2021-12-13 | Stop reason: HOSPADM

## 2021-12-12 RX ADMIN — APIXABAN 10 MG: 5 TABLET, FILM COATED ORAL at 08:50

## 2021-12-12 RX ADMIN — Medication 10 ML: at 17:51

## 2021-12-12 RX ADMIN — APIXABAN 10 MG: 5 TABLET, FILM COATED ORAL at 17:51

## 2021-12-12 RX ADMIN — INSULIN HUMAN 30 UNITS: 100 INJECTION, SUSPENSION SUBCUTANEOUS at 17:51

## 2021-12-12 RX ADMIN — BARICITINIB 4 MG: 2 TABLET, FILM COATED ORAL at 08:50

## 2021-12-12 RX ADMIN — INSULIN HUMAN 30 UNITS: 100 INJECTION, SUSPENSION SUBCUTANEOUS at 08:50

## 2021-12-12 RX ADMIN — INSULIN LISPRO 16 UNITS: 100 INJECTION, SOLUTION INTRAVENOUS; SUBCUTANEOUS at 12:23

## 2021-12-12 RX ADMIN — IPRATROPIUM BROMIDE AND ALBUTEROL 1 PUFF: 20; 100 SPRAY, METERED RESPIRATORY (INHALATION) at 00:46

## 2021-12-12 RX ADMIN — DEXAMETHASONE SODIUM PHOSPHATE 10 MG: 4 INJECTION, SOLUTION INTRAMUSCULAR; INTRAVENOUS at 08:50

## 2021-12-12 RX ADMIN — INSULIN LISPRO 5 UNITS: 100 INJECTION, SOLUTION INTRAVENOUS; SUBCUTANEOUS at 21:50

## 2021-12-12 RX ADMIN — IPRATROPIUM BROMIDE AND ALBUTEROL 1 PUFF: 20; 100 SPRAY, METERED RESPIRATORY (INHALATION) at 18:50

## 2021-12-12 RX ADMIN — INSULIN LISPRO 3 UNITS: 100 INJECTION, SOLUTION INTRAVENOUS; SUBCUTANEOUS at 08:50

## 2021-12-12 RX ADMIN — Medication 10 ML: at 06:09

## 2021-12-12 RX ADMIN — IPRATROPIUM BROMIDE AND ALBUTEROL 1 PUFF: 20; 100 SPRAY, METERED RESPIRATORY (INHALATION) at 14:55

## 2021-12-12 RX ADMIN — INSULIN LISPRO 16 UNITS: 100 INJECTION, SOLUTION INTRAVENOUS; SUBCUTANEOUS at 17:51

## 2021-12-12 RX ADMIN — Medication 10 ML: at 21:50

## 2021-12-12 RX ADMIN — IPRATROPIUM BROMIDE AND ALBUTEROL 1 PUFF: 20; 100 SPRAY, METERED RESPIRATORY (INHALATION) at 08:53

## 2021-12-12 NOTE — PROGRESS NOTES
Karan Helton [facility]  [address] [facility], [place], [id number] [place] [address] Nw  [phone]      Medical Progress Note      NAME: Kendal Velazco   :  1985  MRM:  [id number]    Date/Time: 2021           Assessment / Plan:     #Acute respiratory failure with hypoxia ([address] (2021): Presented on nasal canula , but rapid worsening  requiring BiPAP. Improved to HFNC and now on 2L nc today and sometimes RA.    - Dex 10mg BID -10    - To daily     - To PO 6mg tmr   - Baricitinib    - LMWH 1mg/kg to apix 12/10   - IS, supportive care    #COVID-19 (2021): Unvaccinated. As above    #Bilat DVT:    - LMWH 1mg/kg   - Changed to apixaban      #Hyperglycemia/ Hx of DM:A1c 11%   - Cont NPH 30 BID, cont resistant SSI    - Plan to transition to glargine, SSI prior to DC and will need DM education w/ insulin   - BGs to ICU goals     #Obese: RF for poor course above, but stable at present    #Gallbladder polyp: f/u OP. Care Plan discussed with: Patient    Discussed:  Care Plan    Prophylaxis:  Lovenox    Disposition:  Home w/Family           ___________________________________________________    Attending Physician: Morse Boxer, MD        Subjective:     Chief Complaint:  Kayce Sandra, feels very well, on room air at times    ROS:  (bold if positive, if negative)    Tolerating PT  Tolerating Diet          Objective:       Vitals:          Last 24hrs VS reviewed since prior progress note.  Most recent are:    Visit Vitals  /70 (BP 1 Location: Left upper arm, BP Patient Position: At rest)   Pulse 96   Temp 99.7 °F (37.6 °C)   Resp 19   Ht 5' 5\" (1.651 m)   Wt 89.8 kg (198 lb)   SpO2 96%   BMI 32.95 kg/m²     SpO2 Readings from Last 6 Encounters:   21 96%   20 97%    O2 Flow Rate (L/min): 2 l/min       Intake/Output Summary (Last 24 hours) at 2021 1353  Last data filed at 2021 0434  Gross per 24 hour   Intake 360 ml   Output    Net 360 ml          Exam:     Physical Exam:    Gen:  Well-developed, well-nourished, in no acute distress, obese  HEENT:  Pink conjunctivae, PERRL, hearing intact to voice, moist mucous membranes  Neck:  Supple, without masses, thyroid non-tender  Resp:  No accessory muscle use  Card:  No murmurs, normal S1, S2 without thrills, bruits or peripheral edema  Abd:  Soft, non-tender, non-distended, normoactive bowel sounds are present  Musc:  No cyanosis or clubbing  Skin:  No rashes or ulcers, skin turgor is good  Neuro:  Cranial nerves 3-12 are grossly intact,  strength is 5/5 bilaterally and dorsi / plantarflexion is 5/5 bilaterally, follows commands appropriately  Psych:  Good insight, oriented to person, place and time, alert         Medications Reviewed: (see below)    Lab Data Reviewed: (see below)    ______________________________________________________________________    Medications:     Current Facility-Administered Medications   Medication Dose Route Frequency    [START ON 12/13/2021] dexAMETHasone (DECADRON) tablet 6 mg  6 mg Oral DAILY    insulin NPH (NOVOLIN N, HUMULIN N) injection 30 Units  30 Units SubCUTAneous ACB&D    apixaban (ELIQUIS) tablet 10 mg  10 mg Oral BID    [START ON 12/13/2021] apixaban (ELIQUIS) tablet 5 mg  5 mg Oral BID    influenza vaccine 2021-22 (6 mos+)(PF) (FLUARIX/FLULAVAL/FLUZONE QUAD) injection 0.5 mL  1 Each IntraMUSCular PRIOR TO DISCHARGE    dextrose (D50W) injection syrg 12.5-25 g  25-50 mL IntraVENous PRN    insulin lispro (HUMALOG) injection   SubCUTAneous AC&HS    ipratropium-albuterol (COMBIVENT RESPIMAT) 20 mcg-100 mcg inhalation spray  1 Puff Inhalation Q6H RT    Or    albuterol-ipratropium (DUO-NEB) 2.5 MG-0.5 MG/3 ML  3 mL Nebulization Q6H RT    sodium chloride (NS) flush 5-40 mL  5-40 mL IntraVENous Q8H    sodium chloride (NS) flush 5-40 mL  5-40 mL IntraVENous PRN    acetaminophen (TYLENOL) tablet 650 mg  650 mg Oral Q6H PRN    Or    acetaminophen (TYLENOL) suppository 650 mg  650 mg Rectal Q6H PRN    polyethylene glycol (MIRALAX) packet 17 g  17 g Oral DAILY PRN    ondansetron (ZOFRAN ODT) tablet 4 mg  4 mg Oral Q8H PRN    Or    ondansetron (ZOFRAN) injection 4 mg  4 mg IntraVENous Q6H PRN    glucose chewable tablet 16 g  4 Tablet Oral PRN    glucagon (GLUCAGEN) injection 1 mg  1 mg IntraMUSCular PRN    baricitinib (OLUMIANT) tablet 4 mg  4 mg Oral DAILY            Lab Review:     Recent Labs     12/11/21  0459   WBC 7.9   HGB 15.0   HCT 42.3   *     Recent Labs     12/11/21 0459   *   K 4.5   CL 99   CO2 25   *   BUN 17   CREA 0.67*   CA 8.7   ALB 2.7*   ALT 32     No components found for: [place]

## 2021-12-12 NOTE — PROGRESS NOTES
0700  Bedside and Verbal shift change report given to Mikey James RN (oncoming nurse) by [address] [facility] [address] RN (offgoing nurse). Report included the following information SBAR, Kardex, Procedure Summary, Intake/Output, MAR, Accordion, Recent Results and Cardiac Rhythm NSR. Initial assessment done. AOX4. No complaints of pain. Sitting on the recliner. Off oxygen. Desat to 90% while walking. Will continue to monitor. Visit Vitals  /72 (BP 1 Location: Left upper arm, BP Patient Position: Sitting)   Pulse 96   Temp 98.3 °F (36.8 °C)   Resp 28   Ht 5' 5\" (1.651 m)   Wt 89.8 kg (198 lb)   SpO2 93%   BMI 32.95 kg/m²      1930  Bedside and Verbal shift change report given to [address] [place] [address] Street (oncoming nurse) by [facility] RN (offgoing nurse). Report included the following information SBAR, Kardex, Procedure Summary, Intake/Output, MAR, Accordion and Recent Results.

## 2021-12-12 NOTE — PROGRESS NOTES
Bedside and Verbal shift change report given to Aida Galvez RN (oncoming nurse) by Jonathon Jacobs RN (offgoing nurse). Report included the following information SBAR, Kardex, Intake/Output, MAR and Recent Results. 2105: notified Dr. Darla Koyanagi about pt blood sugar of 414. Orders to administer 7 units of insulin lispro. This patient was assisted with Intentional Toileting every 2 hours during this shift as appropriate. Documentation of ambulation and output reflected on Flowsheet as appropriate. Purposeful hourly rounding was completed using AIDET and 5Ps. Outcomes of PHR documented as they occurred. Bed alarm in use as appropriate. Dual Suction and ambubag in place. Bedside and Verbal shift change report given to Jonathon Jacobs RN (oncoming nurse) by Aida Galvez RN (offgoing nurse). Report included the following information SBAR, Kardex, Intake/Output, MAR and Recent Results.

## 2021-12-13 VITALS
HEART RATE: 87 BPM | SYSTOLIC BLOOD PRESSURE: 114 MMHG | HEIGHT: 65 IN | WEIGHT: 198 LBS | RESPIRATION RATE: 16 BRPM | DIASTOLIC BLOOD PRESSURE: 64 MMHG | OXYGEN SATURATION: 95 % | BODY MASS INDEX: 32.99 KG/M2 | TEMPERATURE: 98.6 F

## 2021-12-13 LAB
GLUCOSE BLD STRIP.AUTO-MCNC: 197 MG/DL (ref 65–117)
GLUCOSE BLD STRIP.AUTO-MCNC: 336 MG/DL (ref 65–117)
SERVICE CMNT-IMP: ABNORMAL
SERVICE CMNT-IMP: ABNORMAL

## 2021-12-13 PROCEDURE — 90686 IIV4 VACC NO PRSV 0.5 ML IM: CPT | Performed by: INTERNAL MEDICINE

## 2021-12-13 PROCEDURE — 94760 N-INVAS EAR/PLS OXIMETRY 1: CPT

## 2021-12-13 PROCEDURE — 74011250636 HC RX REV CODE- 250/636: Performed by: INTERNAL MEDICINE

## 2021-12-13 PROCEDURE — 82962 GLUCOSE BLOOD TEST: CPT

## 2021-12-13 PROCEDURE — 74011250637 HC RX REV CODE- 250/637: Performed by: INTERNAL MEDICINE

## 2021-12-13 PROCEDURE — 94640 AIRWAY INHALATION TREATMENT: CPT

## 2021-12-13 PROCEDURE — 90471 IMMUNIZATION ADMIN: CPT

## 2021-12-13 PROCEDURE — 74011636637 HC RX REV CODE- 636/637: Performed by: INTERNAL MEDICINE

## 2021-12-13 PROCEDURE — 94618 PULMONARY STRESS TESTING: CPT

## 2021-12-13 RX ORDER — GLIPIZIDE 5 MG/1
5 TABLET ORAL 2 TIMES DAILY
Qty: 60 TABLET | Refills: 0 | Status: SHIPPED | OUTPATIENT
Start: 2021-12-13 | End: 2022-01-11 | Stop reason: SDUPTHER

## 2021-12-13 RX ORDER — DEXAMETHASONE 2 MG/1
TABLET ORAL
Qty: 18 TABLET | Refills: 0 | Status: SHIPPED | OUTPATIENT
Start: 2021-12-13 | End: 2021-12-22

## 2021-12-13 RX ORDER — METFORMIN HYDROCHLORIDE 1000 MG/1
1000 TABLET ORAL 2 TIMES DAILY WITH MEALS
Qty: 60 TABLET | Refills: 0 | Status: SHIPPED | OUTPATIENT
Start: 2021-12-13 | End: 2022-01-11 | Stop reason: SDUPTHER

## 2021-12-13 RX ADMIN — INSULIN LISPRO 10 UNITS: 100 INJECTION, SOLUTION INTRAVENOUS; SUBCUTANEOUS at 12:38

## 2021-12-13 RX ADMIN — INSULIN HUMAN 30 UNITS: 100 INJECTION, SUSPENSION SUBCUTANEOUS at 08:50

## 2021-12-13 RX ADMIN — IPRATROPIUM BROMIDE AND ALBUTEROL 1 PUFF: 20; 100 SPRAY, METERED RESPIRATORY (INHALATION) at 13:29

## 2021-12-13 RX ADMIN — IPRATROPIUM BROMIDE AND ALBUTEROL 1 PUFF: 20; 100 SPRAY, METERED RESPIRATORY (INHALATION) at 03:37

## 2021-12-13 RX ADMIN — Medication 10 ML: at 05:19

## 2021-12-13 RX ADMIN — IPRATROPIUM BROMIDE AND ALBUTEROL 1 PUFF: 20; 100 SPRAY, METERED RESPIRATORY (INHALATION) at 08:07

## 2021-12-13 RX ADMIN — INFLUENZA VIRUS VACCINE 0.5 ML: 15; 15; 15; 15 SUSPENSION INTRAMUSCULAR at 14:26

## 2021-12-13 RX ADMIN — INSULIN LISPRO 3 UNITS: 100 INJECTION, SOLUTION INTRAVENOUS; SUBCUTANEOUS at 08:50

## 2021-12-13 RX ADMIN — APIXABAN 5 MG: 5 TABLET, FILM COATED ORAL at 08:50

## 2021-12-13 RX ADMIN — DEXAMETHASONE 6 MG: 6 TABLET ORAL at 08:50

## 2021-12-13 RX ADMIN — BARICITINIB 4 MG: 2 TABLET, FILM COATED ORAL at 08:50

## 2021-12-13 NOTE — PROGRESS NOTES
1346:  Called pt's cell phone and asked if he had chosen a PCP. He said he had but unable to give me a name or a number. He was given a 30 day coupon for Eliquis. It was stressed that he needs to get a PCP and be seen as he would be unable to get any of his medications he needs after 30 days if he does not f/u. Transition of Care Plan: RUR-6%, covid+  1. Respiratory did walking O2 eval; pt does not need any supplemental O2 for home  2. Pulmonology following-pt on RA  3. Needs to complete [employer] application  4. Establish with PCP-list of [facility] providers given to pt--has not yet chosen one  5. Girlfriend or friend to provide transport home at discharge  [facility] [place]

## 2021-12-13 NOTE — DISCHARGE INSTRUCTIONS
HOSPITALIST DISCHARGE INSTRUCTIONS  NAME: Dewey Swift   :  1985   MRN:  [id number]     Date/Time:  2021 12:23 PM    ADMIT DATE: 2021     DISCHARGE DATE: 2021     ADMITTING DIAGNOSIS:  Acute Hypoxemic Respiratory Failure due to COVID-19  Deep Vein Thrombosis  Diabetes Mellitus    DISCHARGE DIAGNOSIS:    Acute Hypoxemic Respiratory Failure due to COVID-19 - You were very ill and required high levels of oxygen support, but did not require mechanical ventilation. You responded well to medical treatment and discharge home without oxygen. You will need to complete 9 more days of steroids, dexamethasone, as prescribed    Deep Vein Thrombosis - This is a blood clot in your leg. This was caused by COVID. You will need to take a blood thinner, apixaban, twice daily for 3 months    Diabetes Mellitus - You have uncontrolled blood sugars. You required high doses of insulin in the hospital. I am discharging you on two medicines to help lower your sugar, metformin and glipizide. Take them twice daily before meals    MEDICATIONS:    · It is important that you take the medication exactly as they are prescribed. · Keep your medication in the bottles provided by the pharmacist and keep a list of the medication names, dosages, and times to be taken in your wallet. · Do not take other medications without consulting your doctor. If you experience any of the following symptoms then please call your primary care physician or return to the emergency room if you cannot get hold of your doctor:  Fever, chills, nausea, vomiting, diarrhea, change in mentation, falling, bleeding, shortness of breath        Information obtained by :  I understand that if any problems occur once I am at home I am to contact my physician. I understand and acknowledge receipt of the instructions indicated above. Physician's or R.N.'s Signature                                                                  Date/Time                                                                                                                                              Patient or Representative Signature                                                          Date/Time

## 2021-12-13 NOTE — DISCHARGE SUMMARY
Physician Discharge Summary     Patient ID:  Katharina Leyva  [id number]  75 y.o.  1985    Admit date: 12/6/2021    Discharge date and time: 12/13/2021    Admission Diagnoses: COVID-19 [U07.1]    Discharge Diagnoses: Active Problems:    COVID-19 (12/6/2021)      Acute respiratory failure with hypoxia [facility]) (12/6/2021)           Hospital Course:   Mr. Manuelito Butcher is a 43 y.o. male reports past medical history of diabetes but does not appear in any home meds, tobacco abuse who is admitted with acute respiratory failure due to Covid. Mr. Manuelito Butcher states that about 5 days ago he began to experience shortness of breath, cough, pleuritic chest pain, fatigue  and fevers/chills. He denies any associated heart palpitations, edema. He has not been vaccinated against Covid. Has known sick contacts. In the emergency room, was found to be Covid positive. He required nonrebreather on admission due to hypoxia. He was admitted for further evaluation and treatment of the following:      #Acute respiratory failure with hypoxia ([address] due to COVID-19(12/6/2021): Unvaccinated. Presented on nasal canula 12/6, but rapid worsening 12/7 requiring BiPAP. Treated with high dose dex and baricitinib. Improved to HFNC and now discharged on room air, tested with ambulation. He will complete dexamethasone taper and follow up with Pulmonology.         #Bilat DVT: Trated with LMWH and transitioned to apixaban. Will need to complete 3 mo therapy as provoked by COVID-19               #DM: A1c 11%. Not on meds as outpt. Required high dose insulin inpt in lieu of DM and steroid use. Did discharge on metformin 1000mg bid and glipizide 5mg bid.  He will need PCP f/u.      #Obese: Encourage weight loss     #Gallbladder polyp: f/u OP.       PCP: None     Consults: Pulmonary/Intensive care    Condition of patient at discharge: good and improved    Discharge Exam:    Physical Exam:    Gen: Well-developed, well-nourished, in no acute distress  HEENT:  Pink conjunctivae, PERRL, hearing intact to voice, moist mucous membranes  Neck: Supple, without masses, thyroid non-tender  Resp: No accessory muscle use, clear breath sounds without wheezes rales or rhonchi  Card: No murmurs, normal S1, S2 without thrills, bruits or peripheral edema  Abd:  Soft, non-tender, non-distended, normoactive bowel sounds are present, no palpable organomegaly and no detectable hernias  Lymph:  No cervical or inguinal adenopathy  Musc: No cyanosis or clubbing  Skin: No rashes or ulcers, skin turgor is good  Neuro:  Cranial nerves are grossly intact, no focal motor weakness, follows commands appropriately  Psych:  Good insight, oriented to person, place and time, alert          Disposition: home    Patient Instructions:   Current Discharge Medication List      START taking these medications    Details   apixaban (ELIQUIS) 5 mg tablet Take 1 Tablet by mouth two (2) times a day for 30 days. Qty: 60 Tablet, Refills: 2      dexAMETHasone (DECADRON) 2 mg tablet Take 3 Tablets by mouth Daily (before breakfast) for 3 days, THEN 2 Tablets Daily (before breakfast) for 3 days, THEN 1 Tablet Daily (before breakfast) for 3 days. Qty: 18 Tablet, Refills: 0      metFORMIN (GLUCOPHAGE) 1,000 mg tablet Take 1 Tablet by mouth two (2) times daily (with meals) for 30 days. Qty: 60 Tablet, Refills: 0      glipiZIDE (GLUCOTROL) 5 mg tablet Take 1 Tablet by mouth two (2) times a day for 30 days. Qty: 60 Tablet, Refills: 0         STOP taking these medications       benzonatate (TESSALON) 200 mg capsule Comments:   Reason for Stopping:         doxycycline (MONODOX) 100 mg capsule Comments:   Reason for Stopping:         naproxen (NAPROSYN) 500 mg tablet Comments:   Reason for Stopping:             Activity: Activity as tolerated  Diet: Diabetic Diet  Wound Care: None needed    Approximate time spent in patient care on day of discharge: 45 min    Signed:   Chente Stafford MD  12/13/2021  12:27 PM

## 2021-12-13 NOTE — PROGRESS NOTES
0700: Bedside shift change report given to [address] (oncoming nurse) by Min Yoder RN (offgoing nurse). Report included the following information SBAR, Kardex, Intake/Output, MAR, Accordion and Cardiac Rhythm NSR. This patient was assisted with Intentional Toileting every 2 hours during this shift as appropriate. Documentation of ambulation and output reflected on Flowsheet as appropriate. Purposeful hourly rounding was completed using AIDET and 5Ps. Outcomes of PHR documented as they occurred. Bed alarm in use as appropriate. Dual Suction and ambubag in place. 1431: Discharge medications reviewed with patient and appropriate educational materials and side effects teaching were provided. Patient had no further questions, patient discharged.

## 2021-12-13 NOTE — PROGRESS NOTES
[facility]     Name: Sinda Aase MRN: [id number]   : 1985 Hospital: [address]   Date: 2021        Impression Plan   1. Acute hypoxemic respiratory failure  2. COVID 19 PNA  3. Obesity  4. VAPE user  5. Bilateral DVTs- CTA neg for PE               · Oxygen to keep sats >90%; now on RA  · Cont. Dexamethasone 6mg daily  · Continue baricitinib; trend LFTs  · Combivent  · Pt instructed to self prone 3 hrs a night and sit at side of bed  · OOB into chair  · Follow d-dimer and CRP  · Enoxaparin- full dose anticoagulation. Otto Kleinel is stable from a pulmonary standpoint. We will sign off and arrange for outpatient follow-up in 2-3 weeks. Please call with questions. Radiology  (personally reviewed) CXR reviewed: bilateral multilobar PNA  Chest CTA: Diffuse bilateral airspace disease and mediastinal adenopathy. No acute pulmonary embolus. Subjective     Cc: shortness of breath    42 yo with PMHx of DM presenting with increasing SOB 5 days ago. States he started with fevers about 7 days ago. COVID 19 positive. Former smoker, [place]. No hx of lung problems. Unvaccinated. O2 requirement has increased since arriving in the ER. Currently on HF 30 L and 90% and sats of 92%. States that the right side of his chest hurts. Interval history  Afebrile  Sats 93% on RA--better  Plts 657  D-dimer 1.41--decreased  CRP . 42--decreased  Blood cx no growth x 6 days    BLE VD: positive for non-well attached deep venous thrombosis in the right proximal femoral vein. Acute occlusive thrombosis present in the left soleal vein. ECHO: EF 55-60%; walls. valves normal    ROS:     Past Medical History:   Diagnosis Date    [address] [address]       History reviewed. No pertinent surgical history. Prior to Admission medications    Medication Sig Start Date End Date Taking?  Authorizing Provider   benzonatate (TESSALON) 200 mg capsule Take 200 mg by mouth three (3) times daily as needed for Cough. Yes Provider, Historical   doxycycline (MONODOX) 100 mg capsule Take 100 mg by mouth two (2) times a day. Yes Provider, Historical   naproxen (NAPROSYN) 500 mg tablet Take 500 mg by mouth two (2) times daily (with meals). Yes Provider, Historical     Current Facility-Administered Medications   Medication Dose Route Frequency    dexAMETHasone (DECADRON) tablet 6 mg  6 mg Oral DAILY    insulin NPH (NOVOLIN N, HUMULIN N) injection 30 Units  30 Units SubCUTAneous ACB&D    apixaban (ELIQUIS) tablet 5 mg  5 mg Oral BID    influenza vaccine 2021-22 (6 mos+)(PF) (FLUARIX/FLULAVAL/FLUZONE QUAD) injection 0.5 mL  1 Each IntraMUSCular PRIOR TO DISCHARGE    insulin lispro (HUMALOG) injection   SubCUTAneous AC&HS    ipratropium-albuterol (COMBIVENT RESPIMAT) 20 mcg-100 mcg inhalation spray  1 Puff Inhalation Q6H RT    Or    albuterol-ipratropium (DUO-NEB) 2.5 MG-0.5 MG/3 ML  3 mL Nebulization Q6H RT    sodium chloride (NS) flush 5-40 mL  5-40 mL IntraVENous Q8H    baricitinib (OLUMIANT) tablet 4 mg  4 mg Oral DAILY     No Known Allergies   Social History     Tobacco Use    Smoking status: Current Every Day Smoker    Smokeless tobacco: Never Used   Substance Use Topics    Alcohol use: Not Currently     Comment: socially       Family History   Problem Relation Age of Onset    Diabetes Mother           Laboratory: I have personally reviewed the flowsheet and labs.      Recent Labs     12/11/21  0459   WBC 7.9   HGB 15.0   HCT 42.3   *     Recent Labs     12/11/21  0459   *   K 4.5   CL 99   CO2 25   *   BUN 17   CREA 0.67*   CA 8.7   ALB 2.7*   ALT 32       Objective:     Visit Vitals  /72 (BP 1 Location: Left upper arm, BP Patient Position: Sitting)   Pulse 87   Temp 98.5 °F (36.9 °C)   Resp 16   Ht 5' 5\" (1.651 m)   Wt 89.8 kg (198 lb)   SpO2 93%   BMI 32.95 kg/m²         Intake/Output Summary (Last 24 hours) at 12/13/2021 5147  Last data filed at 12/13/2021 7826  Gross per 24 hour   Intake 480 ml   Output 0 ml   Net 480 ml     EXAM:   GENERAL: awake, alert, on bipap, HEENT:  anicteric, EOMI, no alar flaring or epistaxis, oral mucosa moist without cyanosis, NECK:  no jugular vein distention, no retractions, no thyromegaly or masses, LUNGS: Diminished, clearer today HEART:  Regular rate and rhythm with no MGR; no edema is present, ABDOMEN:  soft with no tenderness, bowel sounds present, EXTREMITIES:  warm with no cyanosis, SKIN:  no jaundice or ecchymosis and NEUROLOGIC:  alert and oriented, grossly non-focal    Acacia Lopez NP  [facility]

## 2021-12-13 NOTE — PROGRESS NOTES
12/13/21 1020   Resting (Room Air)   SpO2 94   HR 94   During Walk (Room Air)   SpO2 98   HR 92   After Walk   SpO2 95   HR 95   Comments walked in room. returned to chair on room air.    Does the Patient Qualify for Home O2 No

## 2021-12-13 NOTE — PROGRESS NOTES
1900 - Bedside and verbal shift change report given to [facility], RN (oncoming nurse) by Bj Orozco RN (offgoing nurse). Report included the following information: SBAR, Kardex, Intake/Output, MAR, Recent Results, and Med Rec Status. This patient was assisted with intentional toileting every 2 hours during this shift as appropriate. Documentation of ambulation and output reflected on flow sheet as appropriate. Purposeful hourly rounding was completed using AIDET and 5 Ps. Outcomes of PHR documented as they occurred. Bed alarm in use as appropriate. Dual suction and ambubag in place. 0700 - Bedside and verbal shift change report given to Buck Morales RN (oncoming nurse) by [facility], RN (offgoing nurse). Report included the following information: SBAR, Kardex, Intake/Output, MAR, Recent Results, and Med Rec Status.

## 2021-12-14 ENCOUNTER — PATIENT OUTREACH (OUTPATIENT)
Dept: CASE MANAGEMENT | Age: 36
End: 2021-12-14

## 2021-12-14 NOTE — PROGRESS NOTES
Patient contacted regarding COVID-19 diagnosis. Discussed COVID-19 related testing which was available at this time. Test results were positive. Patient informed of results, if available? aware. Care Transition Nurse contacted the patient by telephone to perform post discharge assessment. Call within 2 business days of discharge: Yes Verified name and  with patient as identifiers. Provided introduction to self, and explanation of the CTN/ACM role, and reason for call due to risk factors for infection and/or exposure to COVID-19. Symptoms reviewed with patient who verbalized the following symptoms: no new symptoms and no worsening symptoms      Due to no new or worsening symptoms encounter was not routed to provider for escalation. Discussed follow-up appointments. If no appointment was previously scheduled, appointment scheduling offered:  yes. [facility] No future appointments. Non-[facility] follow up appointment(s):   [facility] [address] [facility]-New patient    At [address] [place] [address]. - texted him date and time and address and phone number of clinic  Pulmonary- PAR- 2-3 weeks. Office calling him     Interventions to address risk factors: Scheduled appointment with PCP-[facility]- new PCP, Education of patient/family/caregiver/guardian to support self-management-  and Assessment and support for treatment adherence and medication management-      Advance Care Planning:   Does patient have an Advance Directive: not on file. Educated patient about risk for severe COVID-19 due to risk factors according to CDC guidelines. CTN reviewed discharge instructions, medical action plan and red flag symptoms with the patient who verbalized understanding. Discussed COVID vaccination status: no. Education provided on COVID-19 vaccination as appropriate. Discussed exposure protocols and quarantine with CDC Guidelines.  Patient was given an opportunity to verbalize any questions and concerns and agrees to contact CTN or health care provider for questions related to their healthcare. Reviewed and educated patient on any new and changed medications related to discharge diagnosis     Was patient discharged with a pulse oximeter? no     CTN provided contact information. Plan for follow-up call in 5-7 days based on severity of symptoms and risk factors.

## 2021-12-15 ENCOUNTER — PATIENT OUTREACH (OUTPATIENT)
Dept: FAMILY MEDICINE CLINIC | Age: 36
End: 2021-12-15

## 2021-12-15 NOTE — PROGRESS NOTES
Msg from [address] coordination Team, Blossom Lowery, RN:    Mynor Gamez contacted pt for hospital follow up call,  Dx: covid 23, acute respiratory failure  · Pt does not have pcp  · Mynor Gamez Requested assistance with getting a hospital follow up appt sooner than 12/30/21. · Pt currently has OG scheduled for 12/30 @ [facility]. Attempted to call patient who speaks English:  lmom with  call back number, and explained reason for call. /moisés    12/17/21: No response from patient. IB msg request sent to [facility] psr team, asking for VV asap with any [facility] provider for covid followup and to establish primary care. Mateo Astudillo

## 2021-12-29 ENCOUNTER — VIRTUAL VISIT (OUTPATIENT)
Dept: FAMILY MEDICINE CLINIC | Age: 36
End: 2021-12-29

## 2021-12-29 DIAGNOSIS — E11.65 UNCONTROLLED TYPE 2 DIABETES MELLITUS WITH HYPERGLYCEMIA (HCC): ICD-10-CM

## 2021-12-29 DIAGNOSIS — Z09 HOSPITAL DISCHARGE FOLLOW-UP: ICD-10-CM

## 2021-12-29 DIAGNOSIS — K64.4 RESIDUAL HEMORRHOIDAL SKIN TAGS: Primary | ICD-10-CM

## 2021-12-29 DIAGNOSIS — I82.403 DEEP VEIN THROMBOSIS (DVT) OF BOTH LOWER EXTREMITIES, UNSPECIFIED CHRONICITY, UNSPECIFIED VEIN (HCC): ICD-10-CM

## 2021-12-29 PROCEDURE — 99213 OFFICE O/P EST LOW 20 MIN: CPT | Performed by: FAMILY MEDICINE

## 2021-12-29 PROCEDURE — 1111F DSCHRG MED/CURRENT MED MERGE: CPT | Performed by: FAMILY MEDICINE

## 2021-12-29 RX ORDER — MULTIVIT-MINS/IRON/FOLIC/LYCOP 8-200-600
1 TABLET ORAL DAILY
Qty: 90 TABLET | Refills: 1 | Status: SHIPPED | OUTPATIENT
Start: 2021-12-29

## 2021-12-29 RX ORDER — MELATONIN
1000 DAILY
Qty: 90 TABLET | Refills: 0 | Status: SHIPPED | OUTPATIENT
Start: 2021-12-29 | End: 2022-03-29

## 2021-12-29 NOTE — PROGRESS NOTES
Genoveva Farris is a 43 y.o. male  Chief Complaint   Patient presents with   [facility] Follow Up      Hosp-Admission on 12/06/21, covid19 positive. Patient c/o with fatigue, and weght loss.  Hemorrhoids     1. Have you been to the ER, urgent care clinic since your last visit? Hospitalized since your last visit? No    2. Have you seen or consulted any other health care providers outside of the [address] [place] [address] since your last visit? Include any pap smears or colon screening.  No

## 2021-12-29 NOTE — PROGRESS NOTES
Transitional Care Management Progress Note    Patient: Bryan Castaneda  : 1985  PCP: None    Date of office visit: 2021   Date of admission: 21  Date of discharge: 21  Hospital: [facility]    Call initiated w/i 2 business dates of discharge: *No response documented in the last 14 days   Date of the most recent call to the patient:     Assessment/Plan:   Diagnoses and all orders for this visit:    1. Residual hemorrhoidal skin tags  -     zyihevbkf-Qjsiwhyd-Jwipq-W.Pet (PREPARATION H MAXIMUM STRENGTH) 0.25-1 % rectal cream; Apply  to affected area three (3) times daily as needed for Hemorrhoids. 2. Hospital discharge follow-up  -     cholecalciferol (VITAMIN D3) (1000 Units /25 mcg) tablet; Take 1 Tablet by mouth daily for 90 days.  -     MV,Ca,Min-Iron-FA-Lycopene (Centrum Men) 8 mg iron- 200 mcg-600 mcg tab; Take 1 Tablet by mouth daily.  -     UT DISCHARGE MEDS RECONCILED W/ CURRENT OUTPATIENT MED LIST    3. Uncontrolled type 2 diabetes mellitus with hyperglycemia ([facility] [address]    4. Deep vein thrombosis (DVT) of both lower extremities, unspecified chronicity, unspecified vein (HCC)    - Continue medications as prescribed  - MV and vitamin D sent to pharmacy per patient request due to fatigue  - F2F appointment in 2 weeks to renew medications and for physical exam/repeat labs  - Prep H sent to pharmacy for hemorrhoids  - Pt lack insight overall re: DM2, will need education/counseling. Subjective:   Bryan Castaneda is a 43 y.o. male presenting today for follow-up after hospital discharge. This encounter and supporting documentation was reviewed if available. Medication reconciliation was performed today. The main problem requiring admission was hypoxia secondary to COVID-19 . Complications during admission: B/L DVT. Interval history/Current status: Patient is feeling better overall but admits to fatigue and hemorrhoids.  He has occasional LE paresthesias. NO fevers. No SOB or VEGA. Taking discharge medications as prescribed including apixiban BID, metformin and glipizide BID. Tolerating the medications well now, though had diarrhea at first.      Admitting symptoms have: significantly improved      Medications marked \"taking\" at this time:  Prior to Admission medications    Medication Sig Start Date End Date Taking? Authorizing Provider   dvxdmqrzv-Aphmboja-Sclrl-W.Pet (PREPARATION H MAXIMUM STRENGTH) 0.25-1 % rectal cream Apply  to affected area three (3) times daily as needed for Hemorrhoids. 12/29/21  Yes Saeid Ahumada MD   cholecalciferol (VITAMIN D3) (1000 Units /25 mcg) tablet Take 1 Tablet by mouth daily for 90 days. 12/29/21 3/29/22 Yes Saeid Ahumada MD   MV,Ca,Min-Iron-FA-Lycopene [facility] Men) 8 mg iron- 200 mcg-600 mcg tab Take 1 Tablet by mouth daily. 12/29/21  Yes Saeid Ahumada MD   apixaban (ELIQUIS) 5 mg tablet Take 1 Tablet by mouth two (2) times a day for 30 days. 12/13/21 1/12/22 Yes Vishnu Candelario MD   metFORMIN (GLUCOPHAGE) 1,000 mg tablet Take 1 Tablet by mouth two (2) times daily (with meals) for 30 days. 12/13/21 1/12/22 Yes Vishnu Candelario MD   glipiZIDE (GLUCOTROL) 5 mg tablet Take 1 Tablet by mouth two (2) times a day for 30 days. 12/13/21 1/12/22 Yes Vishnu Candelario MD        ROS:  General ROS: positive for  - fatigue  Respiratory ROS: no cough, shortness of breath, or wheezing  Cardiovascular ROS: no chest pain or dyspnea on exertion  Gastrointestinal ROS: no abdominal pain, change in bowel habits, or black or bloody stools  Genito-Urinary ROS: no dysuria, trouble voiding, or hematuria  Neurological ROS: negative              Objective: There were no vitals taken for this visit. Physical exam - Not performed; telephone encounter. We discussed the expected course, resolution and complications of the diagnosis(es) in detail.   Medication risks, benefits, costs, interactions, and alternatives were discussed as indicated. I advised him to contact the office if his condition worsens, changes or fails to improve as anticipated. He expressed understanding with the diagnosis(es) and plan.      Shamir Desai MD

## 2021-12-29 NOTE — PROGRESS NOTES
Discharge instructions discussed and medication explained. Patient verbalized understanding.   Melinda Deluca

## 2022-01-11 ENCOUNTER — HOSPITAL ENCOUNTER (OUTPATIENT)
Dept: LAB | Age: 37
Discharge: HOME | End: 2022-01-11

## 2022-01-11 ENCOUNTER — OFFICE VISIT (OUTPATIENT)
Dept: FAMILY MEDICINE CLINIC | Age: 37
End: 2022-01-11

## 2022-01-11 VITALS
SYSTOLIC BLOOD PRESSURE: 124 MMHG | WEIGHT: 227 LBS | OXYGEN SATURATION: 99 % | TEMPERATURE: 97.8 F | DIASTOLIC BLOOD PRESSURE: 81 MMHG | BODY MASS INDEX: 37.82 KG/M2 | HEART RATE: 104 BPM | HEIGHT: 65 IN

## 2022-01-11 DIAGNOSIS — E11.65 UNCONTROLLED TYPE 2 DIABETES MELLITUS WITH HYPERGLYCEMIA (HCC): ICD-10-CM

## 2022-01-11 DIAGNOSIS — N52.1 ERECTILE DYSFUNCTION DUE TO DISEASES CLASSIFIED ELSEWHERE: ICD-10-CM

## 2022-01-11 DIAGNOSIS — E11.65 UNCONTROLLED TYPE 2 DIABETES MELLITUS WITH HYPERGLYCEMIA (HCC): Primary | ICD-10-CM

## 2022-01-11 LAB — GLUCOSE POC: NORMAL MG/DL

## 2022-01-11 PROCEDURE — 83721 ASSAY OF BLOOD LIPOPROTEIN: CPT

## 2022-01-11 PROCEDURE — 80061 LIPID PANEL: CPT

## 2022-01-11 PROCEDURE — 99214 OFFICE O/P EST MOD 30 MIN: CPT | Performed by: FAMILY MEDICINE

## 2022-01-11 PROCEDURE — 82962 GLUCOSE BLOOD TEST: CPT | Performed by: FAMILY MEDICINE

## 2022-01-11 PROCEDURE — 82043 UR ALBUMIN QUANTITATIVE: CPT

## 2022-01-11 RX ORDER — METFORMIN HYDROCHLORIDE 1000 MG/1
1000 TABLET ORAL 2 TIMES DAILY WITH MEALS
Qty: 180 TABLET | Refills: 1 | Status: SHIPPED | OUTPATIENT
Start: 2022-01-11 | End: 2022-07-10

## 2022-01-11 RX ORDER — GLIPIZIDE 5 MG/1
5 TABLET ORAL 2 TIMES DAILY
Qty: 180 TABLET | Refills: 1 | Status: SHIPPED | OUTPATIENT
Start: 2022-01-11 | End: 2022-07-10

## 2022-01-11 RX ORDER — GLIPIZIDE 5 MG/1
5 TABLET ORAL 2 TIMES DAILY
Qty: 180 TABLET | Refills: 1 | Status: SHIPPED | OUTPATIENT
Start: 2022-01-11 | End: 2022-01-11

## 2022-01-11 RX ORDER — SILDENAFIL 25 MG/1
TABLET, FILM COATED ORAL
Qty: 15 TABLET | Refills: 1 | Status: SHIPPED | OUTPATIENT
Start: 2022-01-11

## 2022-01-11 RX ORDER — SILDENAFIL 25 MG/1
TABLET, FILM COATED ORAL
Qty: 15 TABLET | Refills: 1 | Status: SHIPPED | OUTPATIENT
Start: 2022-01-11 | End: 2022-01-11

## 2022-01-11 RX ORDER — METFORMIN HYDROCHLORIDE 1000 MG/1
1000 TABLET ORAL 2 TIMES DAILY WITH MEALS
Qty: 180 TABLET | Refills: 1 | Status: SHIPPED | OUTPATIENT
Start: 2022-01-11 | End: 2022-01-11

## 2022-01-11 NOTE — PROGRESS NOTES
.  Subjective:  used [facility] [employer] [id number]    Viola He is a 43 y.o. male who presents for diabetes follow-up. Has been compliant with medications. RISK FACTORS   History of HTN, Hyperlipidemia, CAD  No, last lipid panel years ago  Family History of Diabetes  Yes  Blood sugars at home: Fasting 118-130   Home BP: Does not check, denies h/o HTN    DIABETIC CARE CHECKLIST   1. Previous lab work reviewed   - Last A1c: 11% in 12/2021   - Last lipid panel: Get today   - Last urine Microalbumin: Get today   - Last creatinine level: 0.67  2. Patient attempting to follow diabetic diet  Yes. Reports ~60lb weight loss in past 3 months as he has been trying to eat healthier. 3. Activity: Was previously unable to exercise d/t Covid infection, but now walks on treadmill 20 minutes every other day. 4. Last eye check  Last went 2 years ago. Has glasses but doesn't wear them d/t causing headaches. Reports blurry vision for past month since having Covid. Patient also reporting erectile dysfunction for the past few months and requesting Viagra.      ROS:  General/Constitutional:   No headache, fever, fatigue, or weight gain       Eyes:   No redness, pruritis, pain, visual changes, swelling, or discharge      Ears:    No pain, loss or changes in hearing     Neck:   No swelling, masses, stiffness, pain, or limited movement     Cardiac:    No chest pain      Respiratory:   No cough or shortness of breath     GI:   No nausea/vomiting, diarrhea, abdominal pain, bloody or dark stools       :   No dysuria or  hematuria    Neurological:   No loss of consciousness, dizziness, seizures, dysarthria, cognitive changes, memory changes,  problems with balance, or unilateral weakness     Skin: No rash     PMHx:  Past Medical History:   Diagnosis Date    Diabetes ([facility] [address]        Meds:   Current Outpatient Medications   Medication Sig Dispense Refill    aqrenhtjn-Owpvthpa-Lxnbp-W.Pet (PREPARATION H MAXIMUM STRENGTH) 0.25-1 % rectal cream Apply  to affected area three (3) times daily as needed for Hemorrhoids. 51 g 0    cholecalciferol (VITAMIN D3) (1000 Units /25 mcg) tablet Take 1 Tablet by mouth daily for 90 days. 90 Tablet 0    MV,Ca,Min-Iron-FA-Lycopene (Centrum Men) 8 mg iron- 200 mcg-600 mcg tab Take 1 Tablet by mouth daily. 90 Tablet 1    apixaban (ELIQUIS) 5 mg tablet Take 1 Tablet by mouth two (2) times a day for 30 days. 60 Tablet 2    metFORMIN (GLUCOPHAGE) 1,000 mg tablet Take 1 Tablet by mouth two (2) times daily (with meals) for 30 days. 60 Tablet 0    glipiZIDE (GLUCOTROL) 5 mg tablet Take 1 Tablet by mouth two (2) times a day for 30 days. 60 Tablet 0       Allergies:   No Known Allergies    Smoker:  Social History     Tobacco Use   Smoking Status Former Smoker    Quit date: 2021    Years since quittin.1   Smokeless Tobacco Never Used       ETOH:   Social History     Substance and Sexual Activity   Alcohol Use Not Currently    Comment: socially        FH:   Family History   Problem Relation Age of Onset    Diabetes Mother          Objective:     Visit Vitals  /81 (BP 1 Location: Left upper arm, BP Patient Position: Sitting)   Pulse (!) 104   Temp 97.8 °F (36.6 °C) (Temporal)   Ht 5' 5\" (1.651 m)   Wt 227 lb (103 kg)   SpO2 99%   BMI 37.77 kg/m²       Physical Examination:   GEN: No apparent distress. Alert and oriented and responds to all questions appropriately. EYES:  Conjunctiva clear; pupils round and reactive to light; extraocular movements are intact. EAR: External ears are normal.  Tympanic membranes are clear and without effusion. NOSE: Turbinates are within normal limits. No drainage  OROPHYARYNX: No oral lesions or exudates.   NECK:  Supple; no masses; thyroid normal           LUNGS: Respirations unlabored; clear to auscultation bilaterally  CARDIOVASCULAR: Regular, rate, and rhythm without murmurs, gallops or rubs   ABDOMEN: Soft; nontender; nondistended; normoactive bowel sounds; no masses or organomegaly  EXT: Well perfused. No edema. SKIN: No obvious rashes. Diabetic Foot Exam:  Protective sensation is intact bilaterally. Pedal pulses are 2+ and normal bilaterally. L foot skin inspection:  normal skin and soft tissue with no gross edema or evidence of acute injury or foot ulcer  R foot skin inspection:  skin and soft tissue appear normal with no significant edema or evidence of acute injury or foot ulcer   CNS:   No obvious cranial nerve deficit    Peripheral sensations intact in all 4 extremities    Power equal in all 4 extremities    No gross focal deficits      Assessment:       ICD-10-CM ICD-9-CM    1. Uncontrolled type 2 diabetes mellitus with hyperglycemia ([facility])  E11.65 250.02 AMB POC GLUCOSE BLOOD, BY GLUCOSE MONITORING DEVICE   2. Erectile dysfunction due to diseases classified elsewhere  N52.1 607.84          Plan:     Diabetes Management:   - Continue current medications. .   - Lipid panel and urine micro albumin today   - Yearly ocular exams and annual foot exams. Will send ophthalmology referral.   - Patient explained the importance of medical compliance and the importance of regular home blood glucose monitoring  - Patient explained the importance of exercising consistently. Encouraged to increase his exercise time and frequency as tolerated. Also emphasized the importance of a strict diabetic diet to reduce future cardiovascular risk   - Medications and possible side effects of the medications discussed with the patient   - Consider nutrition consult for diabetic education   - Patient understands the management plan and agrees to comply with the plan  - RTC in 1 month for repeat A1c    2. Erectile dysfunction due to diseases classified elsewhere: Patient reports erectile dysfxn for the past few months. Likely related to uncontrolled diabetes.    - Will send Viagra       Patient discussed with Dr. Phoenix (Attending Physician)    Signed By:  DO Suzy Alston Medicine Resident

## 2022-01-11 NOTE — PROGRESS NOTES
Christos Soto is a 43 y.o. male   Chief Complaint   Patient presents with    Diabetes     f/u    Blood Clot     f/u     I have seen and evaluated this patient with Dr. Luz Maria Webb, PGY-1 and agree with her excellent documentation. Briefly 43yo male with uncontrolled DM2 who has been implementing lifestyle changes and complying with his medications. New concerns today are for erectile dysfuction. DM2-  Continue Current management. Repeat A1C in March. Refer to Optho for eye exam. Normal foot exam today. ED- Start viagra. R/b/i discussed. Follow up in one month. Please see Dr. Plascencia Fitting note for detailed APSO. ASSESSMENT AND PLAN:    1. Uncontrolled type 2 diabetes mellitus with hyperglycemia (HCC)  - AMB POC GLUCOSE BLOOD, BY GLUCOSE MONITORING DEVICE  - LIPID PANEL; Future  - MICROALBUMIN, UR, RAND W/ MICROALB/CREAT RATIO; Future  - metFORMIN (GLUCOPHAGE) 1,000 mg tablet; Take 1 Tablet by mouth two (2) times daily (with meals) for 180 days. Dispense: 180 Tablet; Refill: 1  - glipiZIDE (GLUCOTROL) 5 mg tablet; Take 1 Tablet by mouth two (2) times a day for 180 days. Dispense: 180 Tablet; Refill: 1  -  DIABETES FOOT EXAM  - REFERRAL TO OPHTHALMOLOGY    2. Erectile dysfunction due to diseases classified elsewhere  - sildenafil citrate (Viagra) 25 mg tablet; t1-2 hours 1 hour prior to sexual activity  Dispense: 15 Tablet;  Refill: 1

## 2022-01-11 NOTE — PROGRESS NOTES
Christos Soto seen at d/c, given AVS and reviewed today's visit with patient. Pharmacy was changed to [facility] per patient's request due to pricing being more affordable than [facility]. Good Rx coupons given to patient as well as instructions on how to use at the pharmacy. Patient verbalized understanding. I have reviewed the provider's instructions with the patient, answering all questions to his satisfaction.     Elver Rhodes RN

## 2022-01-11 NOTE — PROGRESS NOTES
Coordination of Care  1. Have you been to the ER, urgent care clinic since your last visit? Hospitalized since your last visit? No    2. Have you seen or consulted any other health care providers outside of the [address] [place] [address] since your last visit? Include any pap smears or colon screening. No    Does the patient need refills? YES    Learning Assessment Complete?  yes  Depression Screening complete in the past 12 months? yes  Results for orders placed or performed in visit on 01/11/22   AMB POC GLUCOSE BLOOD, BY GLUCOSE MONITORING DEVICE   Result Value Ref Range    Glucose  nf MG/DL

## 2022-01-12 ENCOUNTER — PATIENT OUTREACH (OUTPATIENT)
Dept: CASE MANAGEMENT | Age: 37
End: 2022-01-12

## 2022-01-12 ENCOUNTER — VIRTUAL VISIT (OUTPATIENT)
Dept: FAMILY MEDICINE CLINIC | Age: 37
End: 2022-01-12

## 2022-01-12 DIAGNOSIS — Z71.89 COUNSELING AND COORDINATION OF CARE: Primary | ICD-10-CM

## 2022-01-12 LAB
CHOLEST SERPL-MCNC: 322 MG/DL
CREAT UR-MCNC: 57.4 MG/DL
HDLC SERPL-MCNC: 57 MG/DL
HDLC SERPL: 5.6 {RATIO} (ref 0–5)
LDLC SERPL CALC-MCNC: ABNORMAL MG/DL (ref 0–100)
LDLC SERPL DIRECT ASSAY-MCNC: 206 MG/DL (ref 0–100)
MICROALBUMIN UR-MCNC: <0.5 MG/DL
MICROALBUMIN/CREAT UR-RTO: <9 MG/G (ref 0–30)
TRIGL SERPL-MCNC: 402 MG/DL (ref ?–150)
VLDLC SERPL CALC-MCNC: ABNORMAL MG/DL

## 2022-01-12 NOTE — PROGRESS NOTES
Patient has graduated from the Transitions of Care Coordination  program on 1/12/22. Patient/family has the ability to self-manage at this time Care management goals have been completed. Patient was not referred to the [facility] team for further management. He has established care with Darinel Sanchez [facility]- they are assisting with completing paperwork, etc.    He is working with provider and team to manage Diabetes. He states he feels well and thanked me for my help. Patient has Care Transition Nurse's contact information for any further questions, concerns, or needs.   Patients upcoming visits:    Future Appointments   Date Time Provider Daniella Faith   1/19/2022  1:45 PM Nicholas PAF BS AMB   2/18/2022 10:30 AM Nery Anderson MD [facility] BS AMB

## 2022-01-12 NOTE — PROGRESS NOTES
KEILY YOUNGER called patient and started financial screening for AN. An appt was made for 1/19/22. Patient replied NO to all covid19 screening questions. Pending POI, Tax forms. Pt asked assistance with bills after hospital visit due to covid19. Financial screening for CCard completed. Pending POI, Tax forms, bank statement and bills. Pt was screened for SDOH. Pt chose [employer]. (score 4). No food stamps on the household. 2 children with Medicaid. OW provided information on [employer].

## 2022-01-19 ENCOUNTER — OFFICE VISIT (OUTPATIENT)
Dept: FAMILY MEDICINE CLINIC | Age: 37
End: 2022-01-19

## 2022-01-19 DIAGNOSIS — Z71.89 COUNSELING AND COORDINATION OF CARE: Primary | ICD-10-CM

## 2022-01-19 PROCEDURE — 99080 SPECIAL REPORTS OR FORMS: CPT | Performed by: PHYSICIAN ASSISTANT

## 2022-01-19 NOTE — PROGRESS NOTES
OW met with patient and assisted with AN application. Pt is over income for AN. Staff message sent to pt's provider and [facility]. Care Card application was filled out and completed. Pt will mail it to BS FA. OW provided instructions in Sami to Patient.

## 2023-03-09 NOTE — PROGRESS NOTES
Continued Stay SW/CM Assessment/Plan of Care Note       Active Substitute Decision Maker (SDM)    There are no active Substitute Decision Maker (SDM) on file.           Progress note:  [facility] accepted patient     See SW/CM flowsheets for other objective data.    Disposition Recommendations:  Preliminary discharge destination:    SW/CM recommendation for discharge:      Discharge Plan/Needs:     Continued Care and Services - Discharged on 3/9/2023 Admission date: 3/7/2023 - Discharge disposition: Home-Health Care Services    Home Medical Care     Service Provider Request Status Selected Services Address Phone Fax    [facility] Pending - No Request Sent N/A [address] [facility] [id number] [phone]                  Devices/ Equipment that need to be arranged for discharge:     Accepted   Pending insurance authorization   Others:    Anticipated date of DME availability:     Prior To Hospitalization:    Living Situation: Family members and residing at House.    Support Systems: Family members   Home Devices/Equipment: None   Mobility Assist Devices: None   Type of Service Prior to Hospitalization: None     Patient/Family discharge goal (s):        Resources provided:           Therapy Recommendations for Discharge:   PT:   Recommendation for Discharge: PT IL: Patient requires 24 HOUR assistance to perform mobility and/or ADLs safely, Patient is appropriate for Physical Therapy 1-3 times per week  OT:    Recommendations for Discharge: OT IL: Patient requires 24 HOUR assistance to perform mobility and/or ADLs safely  SLP:      Mobility Equipment Recommended for Discharge: likely RW      Barriers to Discharge  Identified Barriers to Discharge/Transition Planning:                   Negative microalbuminuria    LDL elevated. Statin recommended per ASCVD guidelines - will discuss with patient at followup next month.

## 2024-01-12 NOTE — PROGRESS NOTES
Resting quietly this shift, watching Tv and talking with family on phone. Encouraged to get oob, walk in place, TCDB/ IS every 1h while awake. Sat 95-98% with HF O2 as ordered. See assessment and interventions. Xray Chest 1 View- PORTABLE-Urgent